# Patient Record
Sex: FEMALE | Race: WHITE | NOT HISPANIC OR LATINO | Employment: OTHER | URBAN - METROPOLITAN AREA
[De-identification: names, ages, dates, MRNs, and addresses within clinical notes are randomized per-mention and may not be internally consistent; named-entity substitution may affect disease eponyms.]

---

## 2017-03-01 ENCOUNTER — GENERIC CONVERSION - ENCOUNTER (OUTPATIENT)
Dept: OTHER | Facility: OTHER | Age: 77
End: 2017-03-01

## 2017-03-02 ENCOUNTER — APPOINTMENT (OUTPATIENT)
Dept: PHYSICAL THERAPY | Facility: CLINIC | Age: 77
End: 2017-03-02
Payer: MEDICARE

## 2017-03-02 PROCEDURE — G8991 OTHER PT/OT GOAL STATUS: HCPCS

## 2017-03-02 PROCEDURE — G8990 OTHER PT/OT CURRENT STATUS: HCPCS

## 2017-03-02 PROCEDURE — 97162 PT EVAL MOD COMPLEX 30 MIN: CPT

## 2017-03-06 ENCOUNTER — APPOINTMENT (OUTPATIENT)
Dept: PHYSICAL THERAPY | Facility: CLINIC | Age: 77
End: 2017-03-06
Payer: MEDICARE

## 2017-03-06 PROCEDURE — 97140 MANUAL THERAPY 1/> REGIONS: CPT

## 2017-03-06 PROCEDURE — 97110 THERAPEUTIC EXERCISES: CPT

## 2017-03-08 ENCOUNTER — APPOINTMENT (OUTPATIENT)
Dept: PHYSICAL THERAPY | Facility: CLINIC | Age: 77
End: 2017-03-08
Payer: MEDICARE

## 2017-03-08 PROCEDURE — 97110 THERAPEUTIC EXERCISES: CPT

## 2017-03-10 ENCOUNTER — APPOINTMENT (OUTPATIENT)
Dept: PHYSICAL THERAPY | Facility: CLINIC | Age: 77
End: 2017-03-10
Payer: MEDICARE

## 2017-03-13 ENCOUNTER — APPOINTMENT (OUTPATIENT)
Dept: PHYSICAL THERAPY | Facility: CLINIC | Age: 77
End: 2017-03-13
Payer: MEDICARE

## 2017-03-13 PROCEDURE — 97140 MANUAL THERAPY 1/> REGIONS: CPT

## 2017-03-13 PROCEDURE — 97110 THERAPEUTIC EXERCISES: CPT

## 2017-03-14 ENCOUNTER — APPOINTMENT (OUTPATIENT)
Dept: PHYSICAL THERAPY | Facility: CLINIC | Age: 77
End: 2017-03-14
Payer: MEDICARE

## 2017-03-16 ENCOUNTER — APPOINTMENT (OUTPATIENT)
Dept: PHYSICAL THERAPY | Facility: CLINIC | Age: 77
End: 2017-03-16
Payer: MEDICARE

## 2017-03-16 PROCEDURE — 97110 THERAPEUTIC EXERCISES: CPT

## 2017-03-16 PROCEDURE — 97140 MANUAL THERAPY 1/> REGIONS: CPT

## 2017-03-17 ENCOUNTER — APPOINTMENT (OUTPATIENT)
Dept: PHYSICAL THERAPY | Facility: CLINIC | Age: 77
End: 2017-03-17
Payer: MEDICARE

## 2017-03-17 PROCEDURE — G8979 MOBILITY GOAL STATUS: HCPCS | Performed by: PHYSICAL THERAPIST

## 2017-03-17 PROCEDURE — G8978 MOBILITY CURRENT STATUS: HCPCS | Performed by: PHYSICAL THERAPIST

## 2017-03-17 PROCEDURE — 97110 THERAPEUTIC EXERCISES: CPT

## 2017-03-17 PROCEDURE — 97140 MANUAL THERAPY 1/> REGIONS: CPT

## 2017-03-20 ENCOUNTER — APPOINTMENT (OUTPATIENT)
Dept: PHYSICAL THERAPY | Facility: CLINIC | Age: 77
End: 2017-03-20
Payer: MEDICARE

## 2017-03-20 PROCEDURE — 97110 THERAPEUTIC EXERCISES: CPT

## 2017-03-20 PROCEDURE — 97140 MANUAL THERAPY 1/> REGIONS: CPT

## 2017-03-22 ENCOUNTER — APPOINTMENT (OUTPATIENT)
Dept: PHYSICAL THERAPY | Facility: CLINIC | Age: 77
End: 2017-03-22
Payer: MEDICARE

## 2017-03-23 ENCOUNTER — APPOINTMENT (OUTPATIENT)
Dept: PHYSICAL THERAPY | Facility: CLINIC | Age: 77
End: 2017-03-23
Payer: MEDICARE

## 2017-03-23 PROCEDURE — 97140 MANUAL THERAPY 1/> REGIONS: CPT

## 2017-03-23 PROCEDURE — 97110 THERAPEUTIC EXERCISES: CPT

## 2017-03-24 ENCOUNTER — APPOINTMENT (OUTPATIENT)
Dept: PHYSICAL THERAPY | Facility: CLINIC | Age: 77
End: 2017-03-24
Payer: MEDICARE

## 2017-03-24 PROCEDURE — 97140 MANUAL THERAPY 1/> REGIONS: CPT

## 2017-03-24 PROCEDURE — 97110 THERAPEUTIC EXERCISES: CPT

## 2017-03-27 ENCOUNTER — APPOINTMENT (OUTPATIENT)
Dept: PHYSICAL THERAPY | Facility: CLINIC | Age: 77
End: 2017-03-27
Payer: MEDICARE

## 2017-03-27 PROCEDURE — 97140 MANUAL THERAPY 1/> REGIONS: CPT

## 2017-03-27 PROCEDURE — 97110 THERAPEUTIC EXERCISES: CPT

## 2017-03-27 PROCEDURE — G8991 OTHER PT/OT GOAL STATUS: HCPCS | Performed by: PHYSICAL THERAPIST

## 2017-03-27 PROCEDURE — G8990 OTHER PT/OT CURRENT STATUS: HCPCS | Performed by: PHYSICAL THERAPIST

## 2017-03-29 ENCOUNTER — APPOINTMENT (OUTPATIENT)
Dept: PHYSICAL THERAPY | Facility: CLINIC | Age: 77
End: 2017-03-29
Payer: MEDICARE

## 2017-03-29 PROCEDURE — 97110 THERAPEUTIC EXERCISES: CPT

## 2017-03-29 PROCEDURE — 97140 MANUAL THERAPY 1/> REGIONS: CPT

## 2017-03-31 ENCOUNTER — APPOINTMENT (OUTPATIENT)
Dept: PHYSICAL THERAPY | Facility: CLINIC | Age: 77
End: 2017-03-31
Payer: MEDICARE

## 2017-03-31 PROCEDURE — 97110 THERAPEUTIC EXERCISES: CPT

## 2017-03-31 PROCEDURE — 97140 MANUAL THERAPY 1/> REGIONS: CPT

## 2017-04-03 ENCOUNTER — APPOINTMENT (OUTPATIENT)
Dept: PHYSICAL THERAPY | Facility: CLINIC | Age: 77
End: 2017-04-03
Payer: MEDICARE

## 2017-04-03 PROCEDURE — 97110 THERAPEUTIC EXERCISES: CPT

## 2017-04-03 PROCEDURE — 97140 MANUAL THERAPY 1/> REGIONS: CPT

## 2017-04-05 ENCOUNTER — APPOINTMENT (OUTPATIENT)
Dept: PHYSICAL THERAPY | Facility: CLINIC | Age: 77
End: 2017-04-05
Payer: MEDICARE

## 2017-04-05 PROCEDURE — 97110 THERAPEUTIC EXERCISES: CPT

## 2017-04-05 PROCEDURE — 97140 MANUAL THERAPY 1/> REGIONS: CPT

## 2017-04-07 ENCOUNTER — APPOINTMENT (OUTPATIENT)
Dept: PHYSICAL THERAPY | Facility: CLINIC | Age: 77
End: 2017-04-07
Payer: MEDICARE

## 2017-04-07 PROCEDURE — 97110 THERAPEUTIC EXERCISES: CPT

## 2017-04-10 ENCOUNTER — APPOINTMENT (OUTPATIENT)
Dept: PHYSICAL THERAPY | Facility: CLINIC | Age: 77
End: 2017-04-10
Payer: MEDICARE

## 2017-04-10 PROCEDURE — 97110 THERAPEUTIC EXERCISES: CPT

## 2017-04-12 ENCOUNTER — APPOINTMENT (OUTPATIENT)
Dept: PHYSICAL THERAPY | Facility: CLINIC | Age: 77
End: 2017-04-12
Payer: MEDICARE

## 2017-04-12 PROCEDURE — 97140 MANUAL THERAPY 1/> REGIONS: CPT

## 2017-04-12 PROCEDURE — 97110 THERAPEUTIC EXERCISES: CPT

## 2017-04-14 ENCOUNTER — APPOINTMENT (OUTPATIENT)
Dept: PHYSICAL THERAPY | Facility: CLINIC | Age: 77
End: 2017-04-14
Payer: MEDICARE

## 2017-04-14 PROCEDURE — 97110 THERAPEUTIC EXERCISES: CPT

## 2017-04-14 PROCEDURE — 97140 MANUAL THERAPY 1/> REGIONS: CPT

## 2017-04-17 ENCOUNTER — APPOINTMENT (OUTPATIENT)
Dept: PHYSICAL THERAPY | Facility: CLINIC | Age: 77
End: 2017-04-17
Payer: MEDICARE

## 2017-04-17 PROCEDURE — 97110 THERAPEUTIC EXERCISES: CPT

## 2017-04-17 PROCEDURE — 97140 MANUAL THERAPY 1/> REGIONS: CPT

## 2017-04-19 ENCOUNTER — APPOINTMENT (OUTPATIENT)
Dept: PHYSICAL THERAPY | Facility: CLINIC | Age: 77
End: 2017-04-19
Payer: MEDICARE

## 2017-04-19 PROCEDURE — 97140 MANUAL THERAPY 1/> REGIONS: CPT

## 2017-04-19 PROCEDURE — G8991 OTHER PT/OT GOAL STATUS: HCPCS

## 2017-04-19 PROCEDURE — 97110 THERAPEUTIC EXERCISES: CPT

## 2017-04-19 PROCEDURE — G8990 OTHER PT/OT CURRENT STATUS: HCPCS

## 2017-04-19 PROCEDURE — 97164 PT RE-EVAL EST PLAN CARE: CPT

## 2017-04-20 ENCOUNTER — APPOINTMENT (OUTPATIENT)
Dept: PHYSICAL THERAPY | Facility: CLINIC | Age: 77
End: 2017-04-20
Payer: MEDICARE

## 2017-04-21 ENCOUNTER — APPOINTMENT (OUTPATIENT)
Dept: PHYSICAL THERAPY | Facility: CLINIC | Age: 77
End: 2017-04-21
Payer: MEDICARE

## 2017-04-21 PROCEDURE — 97140 MANUAL THERAPY 1/> REGIONS: CPT

## 2017-04-21 PROCEDURE — 97110 THERAPEUTIC EXERCISES: CPT

## 2017-04-24 ENCOUNTER — APPOINTMENT (OUTPATIENT)
Dept: PHYSICAL THERAPY | Facility: CLINIC | Age: 77
End: 2017-04-24
Payer: MEDICARE

## 2017-04-24 PROCEDURE — 97140 MANUAL THERAPY 1/> REGIONS: CPT

## 2017-04-24 PROCEDURE — 97110 THERAPEUTIC EXERCISES: CPT

## 2017-04-28 ENCOUNTER — APPOINTMENT (OUTPATIENT)
Dept: PHYSICAL THERAPY | Facility: CLINIC | Age: 77
End: 2017-04-28
Payer: MEDICARE

## 2017-04-28 PROCEDURE — 97110 THERAPEUTIC EXERCISES: CPT

## 2017-05-15 ENCOUNTER — ALLSCRIPTS OFFICE VISIT (OUTPATIENT)
Dept: OTHER | Facility: OTHER | Age: 77
End: 2017-05-15

## 2017-06-28 ENCOUNTER — GENERIC CONVERSION - ENCOUNTER (OUTPATIENT)
Dept: OTHER | Facility: OTHER | Age: 77
End: 2017-06-28

## 2017-08-08 ENCOUNTER — GENERIC CONVERSION - ENCOUNTER (OUTPATIENT)
Dept: OTHER | Facility: OTHER | Age: 77
End: 2017-08-08

## 2017-11-01 ENCOUNTER — ALLSCRIPTS OFFICE VISIT (OUTPATIENT)
Dept: OTHER | Facility: OTHER | Age: 77
End: 2017-11-01

## 2017-11-02 NOTE — PROGRESS NOTES
Assessment  1  Non-healing skin lesion (709 9) (L98 9)    Plan  Schedule Punch Bx     Discussion/Summary    Non-healing skin lesion on the mid-chest  After discussion w/ the pt, will schedule a punch Bx  History of Present Illness  The patient is being seen for an initial evaluation of skin lesion(s)  Initial presentation was unknown month(s) ago  The patient presented with a single lesion  Previous presentation included Non-helaing  This problem has not been previously evaluated  This problem has not been previously treated  The patient is currently asymptomatic        68 y/o WF for evaluation of a non-healing lesion on the mid-lower chest  She cannot recall how long she has had the lesion  Denies pain, d/c       Review of Systems    Constitutional: No fever, no chills, feels well, no tiredness, no recent weight gain or weight loss  Cardiovascular: No complaints of slow heart rate, no fast heart rate, no chest pain, no palpitations, no leg claudication, no lower extremity edema  Respiratory: No complaints of shortness of breath, no wheezing, no cough, no SOB on exertion, no orthopnea, no PND  Musculoskeletal: arthralgias-- and-- joint stiffness  Integumentary: as noted in HPI  Neurological: No complaints of headache, no confusion, no convulsions, no numbness, no dizziness or fainting, no tingling, no limb weakness, no difficulty walking  Psychiatric: Not suicidal, no sleep disturbance, no anxiety or depression, no change in personality, no emotional problems  Endocrine: No complaints of proptosis, no hot flashes, no muscle weakness, no deepening of the voice, no feelings of weakness  Hematologic/Lymphatic: No complaints of swollen glands, no swollen glands in the neck, does not bleed easily, does not bruise easily  ROS reviewed  Active Problems  1  Alcoholism, chronic (303 90) (F10 20)   2  Depression (311) (F32 9)   3  Fatigue (780 79) (R53 83)   4   Pain of right hip joint (995 45) (O66 166)    Surgical History  1  History of Hip Surgery    The surgical history was reviewed and updated today  Family History  Mother    1  Family history of lung cancer (V16 1) (Z80 1)  Father    2  Family history of diabetes mellitus (V18 0) (Z83 3)  Maternal Grandmother    3  Family history of diabetes mellitus (V18 0) (Z83 3)    Social History   · Alcohol use (V49 89) (Z78 9)   · Former smoker (Y41 51) (I19 707)   · Lives alone with help available (V60 3) (Z60 2)   · Retired   ·  (V61 07) (Z63 4)  The social history was reviewed and updated today  Current Meds   1  Citalopram Hydrobromide 10 MG Oral Tablet; Therapy: (Recorded:41Bkn1059) to Recorded    Allergies  1  Penicillins    Vitals  Vital Signs    Recorded: 19CPG0655 27:42KK   Systolic 613, RUE, Sitting   Diastolic 62, RUE, Sitting   Height 5 ft 7 in     Physical Exam    Constitutional   General appearance: No acute distress, well appearing and well nourished  Musculoskeletal   Gait and station: Abnormal   Gait evaluation demonstrated antalgia on the right-- and-- limping on the right  Skin   Skin and subcutaneous tissue: Normal without rashes or lesions  Examination of the skin for lesions: Abnormal  -- (--1 cm diameter circular and raised lesion on the mid-chest at the level of the Xifoide  Pearly edges  NT, no sx inflammation)        Future Appointments    Date/Time Provider Specialty Site   11/06/2017 11:15 AM WANDY [place]   [facility]     Signatures   Electronically signed by : WANDY Lopez ; Nov 1 2017 12:20PM EST                       (Author)

## 2017-11-02 NOTE — PROGRESS NOTES
Active Problems  1  Alcoholism, chronic (303 90) (F10 20)   2  Depression (311) (F32 9)   3  Fatigue (780 79) (R53 83)   4  Pain of right hip joint (719 45) (M25 551)    Family History  Mother    1  Family history of lung cancer (V16 1) (Z80 1)  Father    2  Family history of diabetes mellitus (V18 0) (Z83 3)  Maternal Grandmother    3  Family history of diabetes mellitus (V18 0) (Z83 3)    Social History   · Alcohol use (V49 89) (Z78 9)   · Former smoker (A08 85) (O85 426)   · Lives alone with help available (V60 3) (Z60 2)   · Retired   ·  (V61 07) (Z63 4)    Surgical History  1  History of Hip Surgery    Current Meds   1  Citalopram Hydrobromide 10 MG Oral Tablet; Therapy: (Recorded:04Nog4373) to Recorded    Allergies  1  Penicillins    Vitals   Recorded: 36RXT9247 38:09VD   Systolic 548, RUE, Sitting   Diastolic 62, RUE, Sitting   Height 5 ft 7 in     Future Appointments    Date/Time Provider Specialty Site   11/06/2017 11:15 AM WANDY [place]   [facility]     Signatures   Electronically signed by : WANDY Lam ; Nov 1 2017 12:43PM EST                       (Author)

## 2017-11-06 DIAGNOSIS — L98.9 DISORDER OF SKIN OR SUBCUTANEOUS TISSUE: ICD-10-CM

## 2017-11-06 PROCEDURE — 88305 TISSUE EXAM BY PATHOLOGIST: CPT | Performed by: FAMILY MEDICINE

## 2017-11-07 ENCOUNTER — LAB REQUISITION (OUTPATIENT)
Dept: LAB | Facility: HOSPITAL | Age: 77
End: 2017-11-07
Payer: MEDICARE

## 2017-11-07 DIAGNOSIS — L98.9 DISORDER OF SKIN OR SUBCUTANEOUS TISSUE: ICD-10-CM

## 2017-12-18 ENCOUNTER — GENERIC CONVERSION - ENCOUNTER (OUTPATIENT)
Dept: FAMILY MEDICINE CLINIC | Facility: CLINIC | Age: 77
End: 2017-12-18

## 2018-01-14 VITALS
SYSTOLIC BLOOD PRESSURE: 120 MMHG | BODY MASS INDEX: 25.58 KG/M2 | DIASTOLIC BLOOD PRESSURE: 80 MMHG | HEIGHT: 67 IN | WEIGHT: 163 LBS

## 2018-01-14 VITALS — DIASTOLIC BLOOD PRESSURE: 62 MMHG | SYSTOLIC BLOOD PRESSURE: 114 MMHG | HEIGHT: 67 IN

## 2018-03-28 ENCOUNTER — OFFICE VISIT (OUTPATIENT)
Dept: FAMILY MEDICINE CLINIC | Facility: CLINIC | Age: 78
End: 2018-03-28
Payer: MEDICARE

## 2018-03-28 VITALS
BODY MASS INDEX: 25.01 KG/M2 | OXYGEN SATURATION: 98 % | HEIGHT: 68 IN | DIASTOLIC BLOOD PRESSURE: 68 MMHG | WEIGHT: 165 LBS | SYSTOLIC BLOOD PRESSURE: 114 MMHG | TEMPERATURE: 99.1 F | HEART RATE: 84 BPM

## 2018-03-28 DIAGNOSIS — Z01.818 PREOPERATIVE CLEARANCE: Primary | ICD-10-CM

## 2018-03-28 DIAGNOSIS — M16.12 PRIMARY OSTEOARTHRITIS OF LEFT HIP: ICD-10-CM

## 2018-03-28 DIAGNOSIS — R06.00 DYSPNEA ON EXERTION: ICD-10-CM

## 2018-03-28 PROBLEM — F32.A DEPRESSION: Status: ACTIVE | Noted: 2017-05-15

## 2018-03-28 PROBLEM — R53.83 FATIGUE: Status: ACTIVE | Noted: 2017-05-15

## 2018-03-28 PROBLEM — F10.20 ALCOHOLISM, CHRONIC (HCC): Status: ACTIVE | Noted: 2017-05-15

## 2018-03-28 PROCEDURE — 99214 OFFICE O/P EST MOD 30 MIN: CPT | Performed by: PHYSICIAN ASSISTANT

## 2018-03-28 RX ORDER — CHLORAL HYDRATE 500 MG
1000 CAPSULE ORAL
COMMUNITY
End: 2019-01-07 | Stop reason: SDUPTHER

## 2018-03-28 RX ORDER — CITALOPRAM 10 MG/1
10 TABLET ORAL
COMMUNITY
End: 2018-10-01 | Stop reason: SDUPTHER

## 2018-03-28 NOTE — PROGRESS NOTES
Subjective:     Zahira Bobo is a 69 y o  female who presents to the office today for a preoperative consultation at the request of surgeon Dr Nazia Peña who plans on performing L VITO on   This consultation is requested for the specific conditions prompting preoperative evaluation (i e  because of potential affect on operative risk): Karley Singletary Planned anesthesia: general  The patient has the following known anesthesia issues: none  Patients bleeding risk: no recent abnormal bleeding  Patient does not have objections to receiving blood products if needed  Patient admit to GARZA but no lightheadedness or CP  The following portions of the patient's history were reviewed and updated as appropriate: allergies, current medications, past family history, past medical history, past social history, past surgical history and problem list     Review of Systems  A comprehensive review of systems was negative except for: hip pain and GARZA    Objective:      WDWN female, NAD  Ambulates with walker  LungS: CTAB  Heart: RRR no m/r/g  MS: no edema, restricted hip ROM  NECK; no bruits, lad, thyromegaly  ENT: MMM, no loose teeth    Predictors of intubation difficulty:   Morbid obesity? no   Anatomically abnormal facies? no   Prominent incisors? no   Receding mandible? no   Short, thick neck? no   Neck range of motion: normal   Mallampati score: I (soft palate, uvula, fauces, and tonsillar pillars visible)   Thyromental distance: > 6cm ( )   Mouth openin cm   Dentition: No chipped, loose, or missing teeth  Cardiographics  ECG: bradycardia, nonspecific ST and T wave abnormalities  Echocardiogram: not done, will order due to GARZA    Imaging  Chest x-ray: not performed     Lab Review   No visits with results within 2 Month(s) from this visit     Latest known visit with results is:   Lab Requisition on 2017   Component Date Value    Case Report 2017                      Value:Surgical Pathology Report Case: [id number]                                   Authorizing Provider:  Otilio Vaca MD      Collected:           11/06/2017                   Pathologist:           Cuong Henning MD           Received:            11/08/2017 1509              Specimen:    Skin, Other, Upper abdomen                                                                 Final Diagnosis 11/06/2017                      Value: This result contains rich text formatting which cannot be displayed here   Additional Information 11/06/2017                      Value: This result contains rich text formatting which cannot be displayed here  Tasha Kitchen Gross Description 11/06/2017                      Value: This result contains rich text formatting which cannot be displayed here   Clinical Information 11/06/2017                      Value:Basal cell    CBC, CMP, and coagulation studies performed 2 weeks ago by [facility]    Assessment:     69 y o  female with planned surgery as above  Known risk factors for perioperative complications: None    Difficulty with intubation is not anticipated  Cardiac Risk Estimation: pending Stress Echo dobutamine    Current medications which may produce withdrawal symptoms if withheld perioperatively: citalopram      Plan:     1  Preoperative workup as follows cardiac stress testing to exclude undiagnosed coronary disease ( )   2  Change in medication regimen before surgery: none, continue medication regimen including morning of surgery, with sip of water  3  Prophylaxis for cardiac events with perioperative beta-blockers: should be considered, specific regimen per anesthesia  4  Invasive hemodynamic monitoring perioperatively: should be considered    5  Deep vein thrombosis prophylaxis postoperatively:regimen to be chosen by surgical team   6  Surveillance for postoperative MI with ECG immediately postoperatively and on postoperative days 1 and 2 AND troponin levels 24 hours postoperatively and on day 4 or hospital discharge (whichever comes first): should be considered    7  Other measures: none

## 2018-04-03 DIAGNOSIS — R94.39 EQUIVOCAL STRESS TEST: Primary | ICD-10-CM

## 2018-05-04 DIAGNOSIS — M16.12 PRIMARY OSTEOARTHRITIS OF LEFT HIP: Primary | ICD-10-CM

## 2018-05-04 RX ORDER — IRON POLYSACCHARIDE COMPLEX 150 MG
325 CAPSULE ORAL
COMMUNITY

## 2018-05-04 RX ORDER — METHOCARBAMOL 500 MG/1
500 TABLET, FILM COATED ORAL 4 TIMES DAILY
Qty: 20 TABLET | Refills: 0 | Status: SHIPPED | OUTPATIENT
Start: 2018-05-04 | End: 2018-06-01

## 2018-05-21 ENCOUNTER — TELEPHONE (OUTPATIENT)
Dept: FAMILY MEDICINE CLINIC | Facility: CLINIC | Age: 78
End: 2018-05-21

## 2018-05-21 DIAGNOSIS — D64.9 LOW HEMOGLOBIN AND LOW HEMATOCRIT: Primary | ICD-10-CM

## 2018-05-24 ENCOUNTER — TELEPHONE (OUTPATIENT)
Dept: FAMILY MEDICINE CLINIC | Facility: CLINIC | Age: 78
End: 2018-05-24

## 2018-05-24 DIAGNOSIS — D64.9 LOW HEMOGLOBIN AND LOW HEMATOCRIT: Primary | ICD-10-CM

## 2018-05-24 LAB
BASOPHILS # BLD AUTO: 0.02 THOUSANDS/ΜL (ref 0–0.1)
BASOPHILS NFR BLD AUTO: 0 % (ref 0–1)
EOSINOPHIL # BLD AUTO: 0.08 THOUSAND/ΜL (ref 0–0.61)
EOSINOPHIL NFR BLD AUTO: 2 % (ref 0–6)
ERYTHROCYTE [DISTWIDTH] IN BLOOD BY AUTOMATED COUNT: 14.4 % (ref 11.6–15.1)
HCT VFR BLD AUTO: 34.8 % (ref 34.8–46.1)
HGB BLD-MCNC: 11.1 G/DL (ref 11.5–15.4)
LYMPHOCYTES # BLD AUTO: 1.74 THOUSANDS/ΜL (ref 0.6–4.47)
LYMPHOCYTES NFR BLD AUTO: 37 % (ref 14–44)
MCH RBC QN AUTO: 28.8 PG (ref 26.8–34.3)
MCHC RBC AUTO-ENTMCNC: 31.9 G/DL (ref 31.4–37.4)
MCV RBC AUTO: 90 FL (ref 82–98)
MONOCYTES # BLD AUTO: 0.54 THOUSAND/ΜL (ref 0.17–1.22)
MONOCYTES NFR BLD AUTO: 11 % (ref 4–12)
NEUTROPHILS # BLD AUTO: 2.36 THOUSANDS/ΜL (ref 1.85–7.62)
NEUTS SEG NFR BLD AUTO: 50 % (ref 43–75)
NRBC BLD AUTO-RTO: 0 /100 WBCS
PLATELET # BLD AUTO: 313 THOUSANDS/UL (ref 149–390)
PMV BLD AUTO: 9.3 FL (ref 8.9–12.7)
RBC # BLD AUTO: 3.86 MILLION/UL (ref 3.81–5.12)
WBC # BLD AUTO: 4.74 THOUSAND/UL (ref 4.31–10.16)

## 2018-05-24 PROCEDURE — 85025 COMPLETE CBC W/AUTO DIFF WBC: CPT | Performed by: PHYSICIAN ASSISTANT

## 2018-05-30 ENCOUNTER — OFFICE VISIT (OUTPATIENT)
Dept: FAMILY MEDICINE CLINIC | Facility: CLINIC | Age: 78
End: 2018-05-30
Payer: MEDICARE

## 2018-05-30 VITALS
DIASTOLIC BLOOD PRESSURE: 60 MMHG | WEIGHT: 160 LBS | HEART RATE: 72 BPM | SYSTOLIC BLOOD PRESSURE: 100 MMHG | OXYGEN SATURATION: 97 % | HEIGHT: 68 IN | TEMPERATURE: 99.1 F | BODY MASS INDEX: 24.25 KG/M2

## 2018-05-30 DIAGNOSIS — M16.12 PRIMARY OSTEOARTHRITIS OF LEFT HIP: Primary | ICD-10-CM

## 2018-05-30 DIAGNOSIS — D64.9 ANEMIA, UNSPECIFIED TYPE: ICD-10-CM

## 2018-05-30 PROBLEM — Z01.818 PREOPERATIVE CLEARANCE: Status: RESOLVED | Noted: 2018-03-28 | Resolved: 2018-05-30

## 2018-05-30 PROCEDURE — 99213 OFFICE O/P EST LOW 20 MIN: CPT | Performed by: PHYSICIAN ASSISTANT

## 2018-05-30 RX ORDER — CHLORAL HYDRATE 500 MG
CAPSULE ORAL
COMMUNITY

## 2018-05-30 RX ORDER — OXYCODONE HYDROCHLORIDE 5 MG/1
TABLET ORAL
COMMUNITY
Start: 2018-05-02 | End: 2019-01-07 | Stop reason: ALTCHOICE

## 2018-05-30 RX ORDER — CEPHALEXIN 500 MG/1
500 CAPSULE ORAL
COMMUNITY
Start: 2018-05-27 | End: 2018-06-03

## 2018-05-30 NOTE — ASSESSMENT & PLAN NOTE
Improving and stable at 11 1  Continue supplemental iron   F u 3 months and will repeat at that time

## 2018-05-30 NOTE — PROGRESS NOTES
Assessment/Plan:    Primary osteoarthritis of left hip  s/p THR  Doing well, Dr Yifan Zamudio following  Experiencing some back pain I expect from the change in alignment, advised she add OTC lidocaine patches and to discuss with Dr Yifan Zamudio  Anemia  Improving and stable at 11 1  Continue supplemental iron  F u 3 months and will repeat at that time       Diagnoses and all orders for this visit:    Primary osteoarthritis of left hip    Anemia, unspecified type    Other orders  -     Multiple Vitamins-Minerals (CENTRUM SILVER 50+MEN PO); Centrum Silver  -     cephalexin (KEFLEX) 500 mg capsule; Take 500 mg by mouth  -     oxyCODONE (ROXICODONE) 5 mg immediate release tablet; Earliest Fill Date: 5/2/18   -     Omega-3 Fatty Acids (FISH OIL) 1,000 mg; Fish Oil  -     Garlic 10 MG CAPS; garlic          Subjective:      Patient ID: Jaci Siegel is a 69 y o  female  71-year-old female presents for follow-up after left total hip replacement  She was admitted on April 11th and discharged April 14th under the care of Dr Yifan Zamudio  Underwent rehab services and had an uncomplicated stay  Continues PT  After surgery on the 12th hemoglobin levels were 9 4  It had trended down to a low of 7 3 on April 19th  Dr Yifan Zamudio prescribed iron supplements  Since then hemoglobin has been rising and 5 days ago hemoglobin was 11 1  No fever, chills, cough, chest pain, leg pain, dysuria, numbness or tingling  No fatigue, lightheadedness, change in bowels  Has some swelling in L ankle that normalizes over night and responds to elevation  Gets worse when dependent  The following portions of the patient's history were reviewed and updated as appropriate: allergies, current medications, past family history, past medical history, past social history, past surgical history and problem list     Review of Systems   Constitutional: Negative for chills and fever  Respiratory: Negative for cough and shortness of breath      Cardiovascular: Negative for chest pain  Musculoskeletal: Positive for arthralgias, back pain and gait problem  Negative for joint swelling  Skin: Negative for color change and rash  Neurological: Negative for weakness and numbness  Objective:      /60 (BP Location: Left arm, Patient Position: Sitting, Cuff Size: Standard)   Pulse 72   Temp 99 1 °F (37 3 °C)   Ht 5' 8" (1 727 m)   Wt 72 6 kg (160 lb)   SpO2 97%   BMI 24 33 kg/m²          Physical Exam   Constitutional: She is oriented to person, place, and time  She appears well-developed and well-nourished  No distress  HENT:   Head: Normocephalic and atraumatic  Mouth/Throat: Oropharynx is clear and moist    Eyes: Conjunctivae and EOM are normal  Pupils are equal, round, and reactive to light  Right eye exhibits no discharge  Left eye exhibits no discharge  No scleral icterus  Neck: Normal range of motion  Neck supple  No thyromegaly present  Cardiovascular: Normal rate, regular rhythm and normal heart sounds  Exam reveals no gallop and no friction rub  No murmur heard  Pulmonary/Chest: Effort normal and breath sounds normal  No respiratory distress  She has no wheezes  She has no rales  Musculoskeletal: Normal range of motion  Ambulating well with cane  L hip appears higher than R hip  Sacral area tender to palpation  2+ pitting edema over L ankle  No redness or tenderness  Pulses palpable  Lymphadenopathy:     She has no cervical adenopathy  Neurological: She is alert and oriented to person, place, and time  No cranial nerve deficit  Skin: Skin is warm and dry  No rash noted  She is not diaphoretic  No erythema  No pallor

## 2018-05-30 NOTE — ASSESSMENT & PLAN NOTE
s/p THR  Doing well, Dr Chaparro Reyes following  Experiencing some back pain I expect from the change in alignment, advised she add OTC lidocaine patches and to discuss with Dr Chaparro Reyes

## 2018-05-31 ENCOUNTER — TELEPHONE (OUTPATIENT)
Dept: FAMILY MEDICINE CLINIC | Facility: CLINIC | Age: 78
End: 2018-05-31

## 2018-06-04 ENCOUNTER — TELEPHONE (OUTPATIENT)
Dept: FAMILY MEDICINE CLINIC | Facility: CLINIC | Age: 78
End: 2018-06-04

## 2018-06-04 NOTE — TELEPHONE ENCOUNTER
Explained below to patient and patient understood  Also told patient to call office if it continues or worsens

## 2018-06-04 NOTE — TELEPHONE ENCOUNTER
Pt called stating she has finished the Keflex course given to her by Urgent care  The swelling and redness are improved, but patient still has the itching and it is going up the back of her ankle/leg  Pt  Inquiring if she needs another round of A/B's  Please advise

## 2018-06-04 NOTE — TELEPHONE ENCOUNTER
----- Message from Maximilian Romo PA-C sent at 6/4/2018  2:50 PM EDT -----  Unlikely infectious if just itchy   She should try OTC hydrocortisone and or calamine lotion

## 2018-06-18 ENCOUNTER — TRANSCRIBE ORDERS (OUTPATIENT)
Dept: PHYSICAL THERAPY | Facility: CLINIC | Age: 78
End: 2018-06-18

## 2018-06-18 ENCOUNTER — EVALUATION (OUTPATIENT)
Dept: PHYSICAL THERAPY | Facility: CLINIC | Age: 78
End: 2018-06-18
Payer: MEDICARE

## 2018-06-18 DIAGNOSIS — Z96.642 STATUS POST TOTAL HIP REPLACEMENT, LEFT: ICD-10-CM

## 2018-06-18 DIAGNOSIS — M25.552 LEFT HIP PAIN: Primary | ICD-10-CM

## 2018-06-18 PROCEDURE — 97161 PT EVAL LOW COMPLEX 20 MIN: CPT

## 2018-06-18 PROCEDURE — G8979 MOBILITY GOAL STATUS: HCPCS

## 2018-06-18 PROCEDURE — 97110 THERAPEUTIC EXERCISES: CPT

## 2018-06-18 PROCEDURE — G8978 MOBILITY CURRENT STATUS: HCPCS

## 2018-06-18 NOTE — LETTER
2018    Kevin Shrestha MD  [address] [facility]  [address] [facility]    Patient: Kay Westfall   YOB: 1940   Date of Visit: 2018     Encounter Diagnosis     ICD-10-CM    1  Left hip pain M25 552    2  Status post total hip replacement, left T14 912        Dear Dr Ji Lopez:    Please review the attached Plan of Care from Leslie Edge recent visit  Please verify that you agree therapy should continue by signing the attached document and sending it back to our office  If you have any questions or concerns, please don't hesitate to call  Sincerely,    Sharyle Small, PT      Referring Provider:      I certify that I have read the below Plan of Care and certify the need for these services furnished under this plan of treatment while under my care  Lonza MD Fady  [address] [facility]: [phone]          PT Evaluation     Today's date: 2018  Patient name: Kay Westfall  : 1940  MRN: [id number]  Referring provider: Ben Mayer MD  Dx:   Encounter Diagnosis     ICD-10-CM    1  Status post revision of total hip replacement Z96 649    2  Left hip pain M25 552                   Assessment  Impairments: abnormal gait, abnormal or restricted ROM, impaired physical strength and lacks appropriate home exercise program    Assessment details: Kay Westfall is a 69 y o  female presents s/p L VITO 18  Pt with PSH L hip osteotomy, with that hardware removed on 18  Kay Westfall has the above listed impairments and will benefit from skilled PT to improve deficits to return to prior level of function  Kay Westfall was educated on eval findings and plan for management  HEP initiated  Alline Friend would benefit from skilled services to improve ROM, strength, flexibility, and function      Understanding of Dx/Px/POC: good   Prognosis: good    Goals  Impairment Goals  - Pt I with initial HEP in 1-2 visits  - Improve ROM equal to contralateral side in 4-6 weeks  - Increase strength to 5/5 in all affected areas in 4-6 week    Functional Goals  - Increase Functional Status Measure to: 56 in 4-6  weeks  - Patient will be independent with comprehensive HEP in 4-6 weeks  - Ambulation is improved to prior level of function in 4-6 weeks  - Stair climbing is improved to prior level of function in 4-6 weeks  - LLE dynamic strength and balance at least 90% of RLE via single leg step test, in 4-6 wks  - TUG with no AD, < 14 sec, in 4-6 wks  Plan  Patient would benefit from: skilled physical therapy  Planned modality interventions: cryotherapy  Planned therapy interventions: stretching, strengthening, therapeutic exercise, flexibility, home exercise program, gait training and patient education  Frequency: 2x week  Duration in visits: 8  Duration in weeks: 4  Treatment plan discussed with: patient        Subjective Evaluation    History of Present Illness  Date of surgery: 2018  Mechanism of injury: surgery  Mechanism of injury: 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  Pt with at least 4 months of L hip pain with LLE atrophy, shortening requiring shoe lift  Pain, deformity worsened, began to develop LBP  These circumstances led to L VITO on 18 with removal of osteotomy hardware  Chief complaint currently "since the surgery he was pleased (Dr Holly Laguerre), he has brought my leg almost to an equal length  I can tell the left leg is a little shorter, and my right knee bothers me because it's taking more of the wear "  Chief complaint gait dysfunction, "I feel like I've gotten weaker, I've lost a lot of my strength "  Baseline ambulation pattern with no AD, required [facility] for 1 month prior to L VITO    Pain  No pain reported  Current pain ratin  At best pain ratin  At worst pain ratin  Progression: improved    Social Support  Steps to enter house: yes  1  Stairs in house: yes   1  Lives in: [place] house  Lives with: alone    Employment status: not working (Retired)    Diagnostic Tests  X-ray: normal (Well placed L VITO prosthesis post op)  Treatments  Previous treatment: physical therapy  Discharged from (in last 30 days): home health care  Discharged from (in last 30 days) comments: D/C from inpt rehab 5/2/18  Patient Goals  Patient goals for therapy: independence with ADLs/IADLs, increased strength, increased motion, improved balance and return to sport/leisure activities  Patient goal: "I'd like to go play golf "        Objective    Gait: SPC R hand, 3 point step to pattern, WBAT LLE, antalgic LLE     TUG: with SPC R hand, 19 71 sec  Observation: L lateral hip VITO incision healing well, no sign of infection  Posture with R knee slightly flexed; when R knee extended pt with level pelvis  OH squat: good balance, ROM 50% of normal     Hip A/PROM:    R hip WFL grossly assessed  R hip ABD 30 deg, ER 32 deg  L hip flexion at least 90 deg  Ext -20 deg  ABD 8 deg  ER 30 deg  B knee A/PROM [facility] grossly assessed  MMT: iliopsoas: R 5/5, L 4/5  Glute med L 2+/5  Glute max L 2+/5  B quads and hamstrings 5/5                Precautions: L VITO    Daily Treatment Diary       Manuals 6/18/ 18            Man str ABD, ext, ER                                                    Exercise Diary              TA cx 10"x 15            Bridge  Stand Adductor str x10            Clamshell L w pillow 10'X 10            Stand HF str             Wall gastroc str             Stand L hip march, ABD, Ext             TKE with t-band             T ball squat             FSU                                                                                                                                                                                      Modalities             Ice

## 2018-06-18 NOTE — PROGRESS NOTES
PT Evaluation     Today's date: 2018  Patient name: Valentina Bagley  : 1940  MRN: [id number]  Referring provider: Sindhu Burgess MD  Dx:   Encounter Diagnosis     ICD-10-CM    1  Status post revision of total hip replacement Z96 649    2  Left hip pain M25 552                   Assessment  Impairments: abnormal gait, abnormal or restricted ROM, impaired physical strength and lacks appropriate home exercise program    Assessment details: Valentina Bagley is a 69 y o  female presents s/p L VITO 18  Pt with PSH L hip osteotomy, with that hardware removed on 18  Valentina Bagley has the above listed impairments and will benefit from skilled PT to improve deficits to return to prior level of function  Valentian Bagley was educated on eval findings and plan for management  HEP initiated  Rita Anderson would benefit from skilled services to improve ROM, strength, flexibility, and function  Understanding of Dx/Px/POC: good   Prognosis: good    Goals  Impairment Goals  - Pt I with initial HEP in 1-2 visits  - Improve ROM equal to contralateral side in 4-6 weeks  - Increase strength to 5/5 in all affected areas in 4-6 week    Functional Goals  - Increase Functional Status Measure to: 56 in 4-6  weeks  - Patient will be independent with comprehensive HEP in 4-6 weeks  - Ambulation is improved to prior level of function in 4-6 weeks  - Stair climbing is improved to prior level of function in 4-6 weeks  - LLE dynamic strength and balance at least 90% of RLE via single leg step test, in 4-6 wks  - TUG with no AD, < 14 sec, in 4-6 wks        Plan  Patient would benefit from: skilled physical therapy  Planned modality interventions: cryotherapy  Planned therapy interventions: stretching, strengthening, therapeutic exercise, flexibility, home exercise program, gait training and patient education  Frequency: 2x week  Duration in visits: 8  Duration in weeks: 4  Treatment plan discussed with: patient        Subjective Evaluation    History of Present Illness  Date of surgery: 2018  Mechanism of injury: surgery  Mechanism of injury: 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  Pt with at least 4 months of L hip pain with LLE atrophy, shortening requiring shoe lift  Pain, deformity worsened, began to develop LBP  These circumstances led to L VITO on 18 with removal of osteotomy hardware  Chief complaint currently "since the surgery he was pleased (Dr Sheela Morgan), he has brought my leg almost to an equal length  I can tell the left leg is a little shorter, and my right knee bothers me because it's taking more of the wear "  Chief complaint gait dysfunction, "I feel like I've gotten weaker, I've lost a lot of my strength "  Baseline ambulation pattern with no AD, required [facility] for 1 month prior to L VITO  Pain  No pain reported  Current pain ratin  At best pain ratin  At worst pain ratin  Progression: improved    Social Support  Steps to enter house: yes  1  Stairs in house: yes   1  Lives in: [facility]  Lives with: alone    Employment status: not working (Retired)    Diagnostic Tests  X-ray: normal (Well placed L VITO prosthesis post op)  Treatments  Previous treatment: physical therapy  Discharged from (in last 30 days): home health care  Discharged from (in last 30 days) comments: D/C from inpt rehab 18  Patient Goals  Patient goals for therapy: independence with ADLs/IADLs, increased strength, increased motion, improved balance and return to sport/leisure activities  Patient goal: "I'd like to go play golf "        Objective    Gait: SPC R hand, 3 point step to pattern, WBAT LLE, antalgic LLE     TUG: with SPC R hand, 19 71 sec  Observation: L lateral hip VITO incision healing well, no sign of infection  Posture with R knee slightly flexed; when R knee extended pt with level pelvis  OH squat: good balance, ROM 50% of normal     Hip A/PROM:    R hip WFL grossly assessed    R hip ABD 30 deg, ER 32 deg  L hip flexion at least 90 deg  Ext -20 deg  ABD 8 deg  ER 30 deg  B knee A/PROM [facility] grossly assessed  MMT: iliopsoas: R 5/5, L 4/5  Glute med L 2+/5  Glute max L 2+/5  B quads and hamstrings 5/5                Precautions: L VITO    Daily Treatment Diary       Manuals 6/18/ 18            Man str ABD, ext, ER                                                    Exercise Diary              TA cx 10"x 15            Bridge  Stand Adductor str x10            Clamshell L w pillow 10'X 10            Stand HF str             Wall gastroc str             Stand L hip march, ABD, Ext             TKE with t-band             T ball squat             FSU                                                                                                                                                                                      Modalities             Ice

## 2018-06-19 ENCOUNTER — OFFICE VISIT (OUTPATIENT)
Dept: PHYSICAL THERAPY | Facility: CLINIC | Age: 78
End: 2018-06-19
Payer: MEDICARE

## 2018-06-19 DIAGNOSIS — M25.552 LEFT HIP PAIN: Primary | ICD-10-CM

## 2018-06-19 DIAGNOSIS — Z96.642 STATUS POST TOTAL HIP REPLACEMENT, LEFT: ICD-10-CM

## 2018-06-19 PROCEDURE — 97110 THERAPEUTIC EXERCISES: CPT

## 2018-06-19 NOTE — PROGRESS NOTES
Daily Note     Today's date: 2018  Patient name: Jimena Concepcion  : 1940  MRN: [id number]  Referring provider: Carolyn Zamarripa MD  Dx:   Encounter Diagnosis     ICD-10-CM    1  Left hip pain M25 552    2  Status post total hip replacement, left [id number] [id number]                   Subjective: pt without new compliant, L hip pain 0/10  Objective: See treatment diary below    Precautions: L VITO    Daily Treatment Diary       Manuals            Man str ABD, ext, ER  20"x5 ea                                                  Exercise Diary              TA cx 10"x 15 -           Bridge  Stand Adductor str x10 x30    No str           Clamshell L w pillow 10'X 10 x30           Stand HF str  No str           Wall gastroc str  20"x5           Stand L hip march, ABD, Ext  1# x10           TKE with t-band  blk 3x10           T ball squat             FSU  4" x10           Nu Step  10'                                                                                                                                                                       Modalities             Ice                                        Assessment: Tolerated treatment well  Patient would benefit from continued PT      Plan: Continue per plan of care

## 2018-06-25 ENCOUNTER — OFFICE VISIT (OUTPATIENT)
Dept: PHYSICAL THERAPY | Facility: CLINIC | Age: 78
End: 2018-06-25
Payer: MEDICARE

## 2018-06-25 DIAGNOSIS — Z96.642 STATUS POST TOTAL HIP REPLACEMENT, LEFT: ICD-10-CM

## 2018-06-25 DIAGNOSIS — M25.552 LEFT HIP PAIN: Primary | ICD-10-CM

## 2018-06-25 PROCEDURE — 97110 THERAPEUTIC EXERCISES: CPT

## 2018-06-25 NOTE — PROGRESS NOTES
Daily Note     Today's date: 2018  Patient name: Yanely Boogie  : 1940  MRN: [id number]  Referring provider: Gloria Alvarez MD  Dx:   Encounter Diagnosis     ICD-10-CM    1  Left hip pain M25 552    2  Status post total hip replacement, left [id number] [id number]        Start Time: 1100  Stop Time: 1153  Total time in clinic (min): 53 minutes    Subjective: Patient indicates generalized joint stiffness and feels that she is leaning while walking  She denies pain with movement or weight bearing  Objective: See treatment diary below    Precautions: L VITO    Daily Treatment Diary       Manuals           Man str ABD, ext, ER  20"x5 ea 20''x5                                                 Exercise Diary              TA cx 10"x 15 -           Bridge  Stand Adductor str x10 x30    No str x30          Clamshell L w pillow 10'X 10 x30 x30          Stand HF str  No str           Wall gastroc str  20"x5 5x20''          Stand L hip march, ABD, Ext  1# x10 2x10          TKE with t-band  blk 3x10 3x10          T ball squat             FSU  4" x10 2x10          Nu Step  10' 10'                                                                                                                                                                      Modalities             Ice                                        Assessment: Patient demonstrated difficulty maintaining a level, neutral pelvis during ambulation  Patient is a good rehabilitation candidate and would benefit from continued skilled PT intervention to address her remaining deficits  Plan: Continue per plan of care

## 2018-06-27 ENCOUNTER — OFFICE VISIT (OUTPATIENT)
Dept: PHYSICAL THERAPY | Facility: CLINIC | Age: 78
End: 2018-06-27
Payer: MEDICARE

## 2018-06-27 DIAGNOSIS — M25.552 LEFT HIP PAIN: Primary | ICD-10-CM

## 2018-06-27 DIAGNOSIS — Z96.642 STATUS POST TOTAL HIP REPLACEMENT, LEFT: ICD-10-CM

## 2018-06-27 PROCEDURE — 97140 MANUAL THERAPY 1/> REGIONS: CPT

## 2018-06-27 PROCEDURE — 97110 THERAPEUTIC EXERCISES: CPT

## 2018-06-27 NOTE — PROGRESS NOTES
Daily Note     Today's date: 2018  Patient name: Andre Kocher  : 1940  MRN: [id number]  Referring provider: Owen Grewal MD  Dx:   Encounter Diagnosis     ICD-10-CM    1  Left hip pain M25 552    2  Status post total hip replacement, left [id number] 642        Start Time: 1000  Stop Time: 1110  Total time in clinic (min): 70 minutes    Subjective: Patient denies pain again this AM and notes that she did not experience any DOMS after the last session  Objective: See treatment diary below    Precautions: L VITO    Daily Treatment Diary       Manuals          Man str ABD, ext, ER  20"x5 ea 20''x5 20''x5                                                Exercise Diary             TA cx 10"x 15 -           Bridge  Stand Adductor str x10 x30    No str x30 x30         Clamshell L w pillow 10'X 10 x30 x30 x30         Stand HF str  No str           Wall gastroc str  20"x5 5x20'' 5x20''         Stand L hip march, ABD, Ext  1# x10 2x10 2x10         TKE with t-band  blk 3x10 3x10 3x10         T ball squat             FSU  4" x10 2x10 2x10         Nu Step  10' 10' 10'         Mini squat    2x10         Sit to stand    2x10                                                                                                                                           Modalities             Ice                                  Assessment: Patient demonstrated mild difficulty with closed chain eccentric loading  Sit to stand was also challenging for the patient  Patient again denied pain post session and would benefit from continued skilled PT intervention  Plan: Continue per plan of care

## 2018-07-02 ENCOUNTER — OFFICE VISIT (OUTPATIENT)
Dept: PHYSICAL THERAPY | Facility: CLINIC | Age: 78
End: 2018-07-02
Payer: MEDICARE

## 2018-07-02 DIAGNOSIS — M25.552 LEFT HIP PAIN: Primary | ICD-10-CM

## 2018-07-02 DIAGNOSIS — Z96.642 STATUS POST TOTAL HIP REPLACEMENT, LEFT: ICD-10-CM

## 2018-07-02 PROCEDURE — 97140 MANUAL THERAPY 1/> REGIONS: CPT

## 2018-07-02 PROCEDURE — 97110 THERAPEUTIC EXERCISES: CPT

## 2018-07-02 NOTE — PROGRESS NOTES
Daily Note     Today's date: 2018  Patient name: Suhail Cota  : 1940  MRN: [id number]  Referring provider: Elsa Jarrett MD  Dx:   Encounter Diagnosis     ICD-10-CM    1  Left hip pain M25 552    2  Status post total hip replacement, left [id number] 642        Start Time: 1000  Stop Time: 1100  Total time in clinic (min): 60 minutes    Subjective: Patient reports doing pretty good but soreness is persistent at 3-4/10  Reports still has trouble with steps  Objective: See treatment diary below    Precautions: L VITO    Daily Treatment Diary       Manuals  7/        Man str ABD, ext, ER  20"x5 ea 20''x5 20''x5 20"x5                                               Exercise Diary     72        TA cx 10"x 15 -           Bridge  Stand Adductor str x10 x30    No str x30 x30 x30        Clamshell L w pillow 10'X 10 x30 x30 x30 x30        Stand HF str  No str           Wall gastroc str  20"x5 5x20'' 5x20'' 20"x5        Stand L hip march, ABD, Ext  1# x10 2x10 2x10 2x10        TKE with t-band  blk 3x10 3x10 3x10 3x10        T ball squat             FSU  4" x10 2x10 2x10 2x10        Nu Step  10' 10' 10' 10'        Mini squat    2x10 2x10        Sit to stand    2x10 2x10                                                                                                                                          Modalities             Ice                            Assessment: Tolerated treatment well  Patient appears to have increasing ROM and strength  Patient requires 25% verbal cueing for exercises  Plan: Continue per plan of care

## 2018-07-05 ENCOUNTER — OFFICE VISIT (OUTPATIENT)
Dept: PHYSICAL THERAPY | Facility: CLINIC | Age: 78
End: 2018-07-05
Payer: MEDICARE

## 2018-07-05 DIAGNOSIS — Z96.642 STATUS POST TOTAL HIP REPLACEMENT, LEFT: ICD-10-CM

## 2018-07-05 DIAGNOSIS — M25.552 LEFT HIP PAIN: Primary | ICD-10-CM

## 2018-07-05 PROCEDURE — 97110 THERAPEUTIC EXERCISES: CPT

## 2018-07-05 NOTE — PROGRESS NOTES
Daily Note     Today's date: 2018  Patient name: Jaci Siegel  : 1940  MRN: [id number]  Referring provider: Sukumar Hubbard MD  Dx:   Encounter Diagnosis     ICD-10-CM    1  Left hip pain M25 552    2  Status post total hip replacement, left Z39 [id number]                   Subjective: pt without new complaint, notes functional mobility "improved a little "  L hip pain 0/10  Objective: See treatment diary below    Precautions: L VITO    Daily Treatment Diary       Manuals  7       Man str ABD, ext, ER  20"x5 ea 20''x5 20''x5 20"x5 x5                                              Exercise Diary            TA cx 10"x 15 -           Bridge  Stand Adductor str x10 x30    No str x30 x30 x30 x30       Clamshell L w pillow 10'X 10 x30 x30 x30 x30 30       Stand HF str  No str           Wall gastroc str  20"x5 5x20'' 5x20'' 20"x5 x5       Stand L hip march, ABD, Ext  1# x10 2x10 2x10 2x10 2# 3x10       TKE with t-band  blk 3x10 3x10 3x10 3x10 Silver 3x10       T ball squat             FSU  4" x10 2x10 2x10 2x10 3x10 step overs       Nu Step  10' 10' 10' 10' 10'       Mini squat    2x10 2x10 3x10       Sit to stand    2x10 2x10 3x10                                                                                                                                         Modalities             Ice                                  Assessment: Tolerated treatment well  Patient exhibited good technique with therapeutic exercises  ROM improving  Plan: Continue per plan of care

## 2018-07-09 ENCOUNTER — APPOINTMENT (OUTPATIENT)
Dept: PHYSICAL THERAPY | Facility: CLINIC | Age: 78
End: 2018-07-09
Payer: MEDICARE

## 2018-07-10 ENCOUNTER — OFFICE VISIT (OUTPATIENT)
Dept: PHYSICAL THERAPY | Facility: CLINIC | Age: 78
End: 2018-07-10
Payer: MEDICARE

## 2018-07-10 DIAGNOSIS — Z96.642 STATUS POST TOTAL HIP REPLACEMENT, LEFT: ICD-10-CM

## 2018-07-10 DIAGNOSIS — M25.552 LEFT HIP PAIN: Primary | ICD-10-CM

## 2018-07-10 PROCEDURE — 97110 THERAPEUTIC EXERCISES: CPT

## 2018-07-10 NOTE — PROGRESS NOTES
Daily Note     Today's date: 7/10/2018  Patient name: Michael Thomas  : 1940  MRN: [id number]  Referring provider: Roel Nice MD  Dx:   Encounter Diagnosis     ICD-10-CM    1  Left hip pain M25 552    2  Status post total hip replacement, left M97 667                   Subjective: pt reports starting to walk around the house with no AD  L hip pain 0/10  Objective: See treatment diary below    Precautions: L VITO    Daily Treatment Diary       Manuals  7/10      Man str ABD, ext, ER  20"x5 ea 20''x5 20''x5 20"x5 x5 x5                                             Exercise Diary            TA cx 10"x 15 -           Bridge  Stand Adductor str x10 x30    No str x30 x30 x30 x30 30      Clamshell L w pillow 10'X 10 x30 x30 x30 x30 30 30      Stand HF str  No str           Wall gastroc str  20"x5 5x20'' 5x20'' 20"x5 x5 x5      Stand L hip march, ABD, Ext  1# x10 2x10 2x10 2x10 2# 3x10 3x10      TKE with t-band  blk 3x10 3x10 3x10 3x10 Silver 3x10 3x10      T ball squat             FSU  4" x10 2x10 2x10 2x10 3x10 step overs 3x10      Nu Step  10' 10' 10' 10' 10' 10'      Mini squat    2x10 2x10 3x10 3x10      Sit to stand    2x10 2x10 3x10 3x10                                                                                                                                        Modalities             Ice                                  Assessment: Tolerated treatment well  Patient demonstrated fatigue post treatment      Plan: Continue per plan of care

## 2018-07-11 ENCOUNTER — OFFICE VISIT (OUTPATIENT)
Dept: PHYSICAL THERAPY | Facility: CLINIC | Age: 78
End: 2018-07-11
Payer: MEDICARE

## 2018-07-11 DIAGNOSIS — Z96.642 STATUS POST TOTAL HIP REPLACEMENT, LEFT: ICD-10-CM

## 2018-07-11 DIAGNOSIS — M25.552 LEFT HIP PAIN: Primary | ICD-10-CM

## 2018-07-11 PROCEDURE — 97110 THERAPEUTIC EXERCISES: CPT

## 2018-07-11 NOTE — PROGRESS NOTES
Daily Note     Today's date: 2018  Patient name: Gina Vieira  : 1940  MRN: [id number]  Referring provider: Katt Lane MD  Dx:   Encounter Diagnosis     ICD-10-CM    1  Left hip pain M25 552    2  Status post total hip replacement, left W98 333                   Subjective: Patient reports has had no pain in hip at all  Reports trying to take bigger strides when walking  Reports stiffness is her biggest complaint at this point  Objective: See treatment diary below  Precautions: L VITO    Daily Treatment Diary       Manuals 6/18/ 18 6/19 6/25 6/27 7/2 7/5 7/10 7/11     Man str ABD, ext, ER  20"x5 ea 20''x5 20''x5 20"x5 x5 x5 x5                                            Exercise Diary            TA cx 10"x 15 -           Bridge  Stand Adductor str x10 x30    No str x30 x30 x30 x30 30 x30     Clamshell L w pillow 10'X 10 x30 x30 x30 x30 30 30 30x5"     Stand HF str  No str           Wall gastroc str  20"x5 5x20'' 5x20'' 20"x5 x5 x5 x5     Stand L hip march, ABD, Ext  1# x10 2x10 2x10 2x10 2# 3x10 3x10 2#  3x10     TKE with t-band  blk 3x10 3x10 3x10 3x10 Silver 3x10 3x10 3x10     T ball squat             FSU  4" x10 2x10 2x10 2x10 3x10 step overs 3x10 3x10     Nu Step  10' 10' 10' 10' 10' 10' 10'     Mini squat    2x10 2x10 3x10 3x10 3x10     Sit to stand    2x10 2x10 3x10 3x10 3x10                                                                                                                                       Modalities             Ice                                Assessment: Tolerated treatment well  Patient continues to have weakness but strength is improving  Patient leg length discrepancy limiting functional ambulation  Patient appears concerned about leg length discrepancy and she will be addressing with doctor  Patient given 30% verbal cueing to improve technique of exercises  Plan: Continue per plan of care

## 2018-07-16 ENCOUNTER — OFFICE VISIT (OUTPATIENT)
Dept: PHYSICAL THERAPY | Facility: CLINIC | Age: 78
End: 2018-07-16
Payer: MEDICARE

## 2018-07-16 DIAGNOSIS — Z96.642 STATUS POST TOTAL HIP REPLACEMENT, LEFT: ICD-10-CM

## 2018-07-16 DIAGNOSIS — M25.552 LEFT HIP PAIN: Primary | ICD-10-CM

## 2018-07-16 PROCEDURE — 97110 THERAPEUTIC EXERCISES: CPT

## 2018-07-16 NOTE — PROGRESS NOTES
Daily Note     Today's date: 2018  Patient name: Vivian Bear  : 1940  MRN: [id number]  Referring provider: Jenniffer Stafford MD  Dx:   Encounter Diagnosis     ICD-10-CM    1  Left hip pain M25 552    2  Status post total hip replacement, left K47 108                   Subjective: L hip pain 0/10   "I'm not sure I'm walking any better "  Pt c/o L shoulder, upper arm pain after pulling clothes out of closet  Pt notes L PSIS pain with sleeping in R sidelying  Notes LLD post-op with RLE long vs L  Objective: See treatment diary below  Pt with L lateral hip discomfort, no anterior stretch with L standing hip flexor stretch and manual HF stretch  LLE markedly short in supine, L ASIS superior vs R   L stand march test (+)  Following self MET for L inom post rotation LLD in supine improved but still present, L ASIS level with R  Following tx, pt gait with SPC R, with improved R stride length, L hip ext with R stride      Precautions: L VITO    Daily Treatment Diary       Manuals 6/18/ 18 6/19 6/25 6/27 7/2 7/5 7/10 7/11 7/16    Man str ABD, ext, ER  20"x5 ea 20''x5 20''x5 20"x5 x5 x5 x5 x5    L inom ant rot mob         G3    L inom ant rot SCS         90"                 Exercise Diary            TA cx 10"x 15 -           Bridge  Stand Adductor str x10 x30    No str x30 x30 x30 x30 30 x30 x30    20"x5    Clamshell L w pillow 10'X 10 x30 x30 x30 x30 30 30 30x5" x5    Stand HF str  No str       No str    Wall gastroc str  20"x5 5x20'' 5x20'' 20"x5 x5 x5 x5 x5    Stand L hip march, ABD, Ext  1# x10 2x10 2x10 2x10 2# 3x10 3x10 2#  3x10 3x10    TKE with t-band  blk 3x10 3x10 3x10 3x10 Silver 3x10 3x10 3x10 3x10    T ball squat             FSU  4" x10 2x10 2x10 2x10 3x10 step overs 3x10 3x10 3x10    Nu Step  10' 10' 10' 10' 10' 10' 10' 10'    Mini squat    2x10 2x10 3x10 3x10 3x10 3x10    Sit to stand    2x10 2x10 3x10 3x10 3x10 3x10    Bridge w ABD, t-band         Green 2x10    Self MET L inom post rot         5"x3                                                                                                            Modalities             Ice                                      Assessment: Tolerated treatment well  Patient would benefit from continued PT  Presenting with functional LLD limiting hip ext stretching due to posterior rotation L side  Gait improved after tx, improved R stride length  Plan: Progress note during next visit

## 2018-07-18 ENCOUNTER — TRANSCRIBE ORDERS (OUTPATIENT)
Dept: PHYSICAL THERAPY | Facility: CLINIC | Age: 78
End: 2018-07-18

## 2018-07-18 ENCOUNTER — EVALUATION (OUTPATIENT)
Dept: PHYSICAL THERAPY | Facility: CLINIC | Age: 78
End: 2018-07-18
Payer: MEDICARE

## 2018-07-18 DIAGNOSIS — M25.552 LEFT HIP PAIN: Primary | ICD-10-CM

## 2018-07-18 DIAGNOSIS — M16.12 PRIMARY OSTEOARTHRITIS OF LEFT HIP: ICD-10-CM

## 2018-07-18 DIAGNOSIS — Z96.642 PRESENCE OF LEFT ARTIFICIAL HIP JOINT: ICD-10-CM

## 2018-07-18 DIAGNOSIS — Z96.642 STATUS POST TOTAL HIP REPLACEMENT, LEFT: ICD-10-CM

## 2018-07-18 PROCEDURE — 97110 THERAPEUTIC EXERCISES: CPT

## 2018-07-18 PROCEDURE — G8979 MOBILITY GOAL STATUS: HCPCS

## 2018-07-18 PROCEDURE — 97164 PT RE-EVAL EST PLAN CARE: CPT

## 2018-07-18 PROCEDURE — G8978 MOBILITY CURRENT STATUS: HCPCS

## 2018-07-18 NOTE — LETTER
2018    MD [address] [phone]    Patient: Milena Peña   YOB: 1940   Date of Visit: 2018     Encounter Diagnosis     ICD-10-CM    1  Left hip pain M25 552    2  Primary osteoarthritis of left hip M16 12    3  Status post total hip replacement, left [id number] [id number]        Dear Dr Selene Morrissey:    Please review the attached Plan of Care from Sandra Fowler recent visit  Please verify that you agree therapy should continue by signing the attached document and sending it back to our office  If you have any questions or concerns, please don't hesitate to call  Sincerely,    Ileana Rasmussen PT      Referring Provider:      I certify that I have read the below Plan of Care and certify the need for these services furnished under this plan of treatment while under my care  Elas Gaspar MD  [address]  [address] [address]  VIA Facsimile: [phone]          Daily Note     Today's date: 2018  Patient name: Milena Peña  : 1940  MRN: [id number]  Referring provider: Ramone Lao MD  Dx:   Encounter Diagnosis     ICD-10-CM    1  Left hip pain M25 552    2  Primary osteoarthritis of left hip M16 12    3  Status post total hip replacement, left Z96 642        Start Time: 952  Stop Time: 1100  Total time in clinic (min): 68 minutes    Subjective: pt noting improved functional mobility, able to do steps in reciprocal pattern with rail  "I'd like to continue with physical therapy, I don't think I'm ready to be done yet  L hip pain 0/10   "I think the stride is a little longer" with gait  Objective: See treatment diary below    L hip pain 0/10  Gait: SPC R hand, 3 point reciprocal steady pattern  Improved R stride length, L hip ext ROM vs SOC  TUG with SPC 17 45 sec (19 71 sec at [facility])  L hip A/PROM: flexion 102 deg, ext -10 deg (-20 deg at [facility])  ABD 17 deg (8 deg at [facility])    ER 40 deg     MMT: L iliopsoas 5/5  In sidelying: L glute med 4-/5, glute max 4/5 (Glutes 2+/5 at [facility])  Dynamic strength and balance LLE with no AD 54% of RLE via single leg step test     Precautions: L VITO    Daily Treatment Diary       Manuals 6/18/ 18 6/19 6/25 6/27 7/2 7/5 7/10 7/11 7/16 7/18   Man str ABD, ext, ER  20"x5 ea 20''x5 20''x5 20"x5 x5 x5 x5 x5 x5 (ext w L inom ant rot manually   L inom ant rot mob         G3 G3   L inom ant rot SCS         90" 90"                Exercise Diary    2/25 2/27 7/2        TA cx 10"x 15 -           Bridge  Stand Adductor str x10 x30    No str x30 x30 x30 x30 30 x30 x30    20"x5 30    x5   Clamshell L w pillow 10'X 10 x30 x30 x30 x30 30 30 30 30 30   Stand HF str  No str       No str    Wall gastroc str  20"x5 5x20'' 5x20'' 20"x5 x5 x5 x5 x5    Stand L hip march, ABD, Ext  1# x10 2x10 2x10 2x10 2# 3x10 3x10 2#  3x10 3x10 3x10   TKE with t-band  blk 3x10 3x10 3x10 3x10 Silver 3x10 3x10 3x10 3x10 3x10   T ball squat             FSU  4" x10 2x10 2x10 2x10 3x10 step overs 3x10 3x10 3x10 3x10   Nu Step  10' 10' 10' 10' 10' 10' 10' 10' 10'   Mini squat    2x10 2x10 3x10 3x10 3x10 3x10 3x10   Sit to stand    2x10 2x10 3x10 3x10 3x10 3x10    Bridge w ABD, t-band         Green 2x10 3x10   Self MET L inom post rot         5"x3 5"x3   High knees gait                                                                                                        Modalities             Ice                                          Assessment: Tolerated treatment well  Patient exhibited good technique with therapeutic exercises and would benefit from continued PT  Following MET for L SI dysfunction and manual therapy, pt able to feel L anterior hip, thigh stretch for first time when stretching hip flexors, improving ext ROM        Impairment Goals  - Pt I with initial HEP in 1-2 visits - met  - Improve ROM equal to contralateral side in 4-6 weeks - not met  - Increase strength to 5/5 in all affected areas in 4-6 week - not met    Functional Goals  - Increase Functional Status Measure to: 56 in 4-6  weeks  - not met - 50  - Patient will be independent with comprehensive HEP in 4-6 weeks - not met  - Ambulation is improved to prior level of function in 4-6 weeks - not met  - Stair climbing is improved to prior level of function in 4-6 weeks - met  - LLE dynamic strength and balance at least 90% of RLE via single leg step test, in 4-6 wks  - not met  - TUG with no AD, < 14 sec, in 4-6 wks  - not met    NEW GOALS:    Impairment Goals    - Improve ROM equal to contralateral side in 2-4 weeks  - Increase strength to 5/5 in all affected areas in 2-4 week    Functional Goals  - Increase Functional Status Measure to: 56 in 2-4 weeks  - Patient will be independent with comprehensive HEP in 2-4 weeks  - Ambulation is improved to prior level of function in 2-4 weeks  - LLE dynamic strength and balance at least 90% of RLE via single leg step test, in 2-4 wks  - TUG with AD PRN, < 14 sec, in 2-4 wks  Kitty Boyd has been compliant with attending PT and home exercise program since initial eval   Jose Finn  has made improvements in objective data since initial eval but is still limited compared to prior level of function  Jose Finn continues with above listed impairments and would benefit from additional skilled PT to address these deficits to return to prior level of function  Jose Finn has attended 10 visits, missed 0 visits  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

## 2018-07-18 NOTE — PROGRESS NOTES
Daily Note     Today's date: 2018  Patient name: Suhail Cota  : 1940  MRN: [id number]  Referring provider: Elsa Jarrett MD  Dx:   Encounter Diagnosis     ICD-10-CM    1  Left hip pain M25 552    2  Primary osteoarthritis of left hip M16 12    3  Status post total hip replacement, left [id number] 642        Start Time: 0952  Stop Time: 1100  Total time in clinic (min): 68 minutes    Subjective: pt noting improved functional mobility, able to do steps in reciprocal pattern with rail  "I'd like to continue with physical therapy, I don't think I'm ready to be done yet  L hip pain 0/10   "I think the stride is a little longer" with gait  Objective: See treatment diary below    L hip pain 0/10  Gait: SPC R hand, 3 point reciprocal steady pattern  Improved R stride length, L hip ext ROM vs SOC  TUG with SPC 17 45 sec (19 71 sec at [facility])  L hip A/PROM: flexion 102 deg, ext -10 deg (-20 deg at [facility])  ABD 17 deg (8 deg at [facility])  ER 40 deg  MMT: L iliopsoas 5/5  In sidelying: L glute med 4-/5, glute max 4/5 (Glutes 2+/5 at [facility])      Dynamic strength and balance LLE with no AD 54% of RLE via single leg step test     Precautions: L VITO    Daily Treatment Diary       Manuals 6/18/ 18 6/19 6/25 6/27 7/2 7/5 7/10 7/11 7/16 7/18   Man str ABD, ext, ER  20"x5 ea 20''x5 20''x5 20"x5 x5 x5 x5 x5 x5 (ext w L inom ant rot manually   L inom ant rot mob         G3 G3   L inom ant rot SCS         90" 90"                Exercise Diary            TA cx 10"x 15 -           Bridge  Stand Adductor str x10 x30    No str x30 x30 x30 x30 30 x30 x30    20"x5 30    x5   Clamshell L w pillow 10'X 10 x30 x30 x30 x30 30 30 30 30 30   Stand HF str  No str       No str    Wall gastroc str  20"x5 5x20'' 5x20'' 20"x5 x5 x5 x5 x5    Stand L hip march, ABD, Ext  1# x10 2x10 2x10 2x10 2# 3x10 3x10 2#  3x10 3x10 3x10   TKE with t-band  blk 3x10 3x10 3x10 3x10 Silver 3x10 3x10 3x10 3x10 3x10   T ball squat             FSU 4" x10 2x10 2x10 2x10 3x10 step overs 3x10 3x10 3x10 3x10   Nu Step  10' 10' 10' 10' 10' 10' 10' 10' 10'   Mini squat    2x10 2x10 3x10 3x10 3x10 3x10 3x10   Sit to stand    2x10 2x10 3x10 3x10 3x10 3x10    Bridge w ABD, t-band         Green 2x10 3x10   Self MET L inom post rot         5"x3 5"x3   High knees gait                                                                                                        Modalities             Ice                                          Assessment: Tolerated treatment well  Patient exhibited good technique with therapeutic exercises and would benefit from continued PT  Following MET for L SI dysfunction and manual therapy, pt able to feel L anterior hip, thigh stretch for first time when stretching hip flexors, improving ext ROM  Impairment Goals  - Pt I with initial HEP in 1-2 visits - met  - Improve ROM equal to contralateral side in 4-6 weeks - not met  - Increase strength to 5/5 in all affected areas in 4-6 week - not met    Functional Goals  - Increase Functional Status Measure to: 56 in 4-6  weeks - not met - 50  - Patient will be independent with comprehensive HEP in 4-6 weeks - not met  - Ambulation is improved to prior level of function in 4-6 weeks - not met  - Stair climbing is improved to prior level of function in 4-6 weeks - met  - LLE dynamic strength and balance at least 90% of RLE via single leg step test, in 4-6 wks  - not met  - TUG with no AD, < 14 sec, in 4-6 wks  - not met    NEW GOALS:    Impairment Goals    - Improve ROM equal to contralateral side in 2-4 weeks  - Increase strength to 5/5 in all affected areas in 2-4 week    Functional Goals  - Increase Functional Status Measure to: 56 in 2-4 weeks  - Patient will be independent with comprehensive HEP in 2-4 weeks  - Ambulation is improved to prior level of function in 2-4 weeks  - LLE dynamic strength and balance at least 90% of RLE via single leg step test, in 2-4 wks    - TUG with AD PRN, < 14 sec, in 2-4 wks  Vivian Bear has been compliant with attending PT and home exercise program since initial eval   Mali Fan  has made improvements in objective data since initial eval but is still limited compared to prior level of function  Mali Fan continues with above listed impairments and would benefit from additional skilled PT to address these deficits to return to prior level of function  Mali Fan has attended 10 visits, missed 0 visits  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

## 2018-07-23 ENCOUNTER — OFFICE VISIT (OUTPATIENT)
Dept: PHYSICAL THERAPY | Facility: CLINIC | Age: 78
End: 2018-07-23
Payer: MEDICARE

## 2018-07-23 DIAGNOSIS — Z96.642 STATUS POST TOTAL HIP REPLACEMENT, LEFT: ICD-10-CM

## 2018-07-23 DIAGNOSIS — M25.552 LEFT HIP PAIN: ICD-10-CM

## 2018-07-23 DIAGNOSIS — M16.12 PRIMARY OSTEOARTHRITIS OF LEFT HIP: Primary | ICD-10-CM

## 2018-07-23 PROCEDURE — 97110 THERAPEUTIC EXERCISES: CPT

## 2018-07-23 NOTE — PROGRESS NOTES
Daily Note     Today's date: 2018  Patient name: Suhail Cota  : 1940  MRN: [id number]  Referring provider: Elsa Jarrett MD  Dx:   Encounter Diagnosis     ICD-10-CM    1  Primary osteoarthritis of left hip M16 12    2  Left hip pain M25 552    3  Status post total hip replacement, left A22 733                   Subjective: Patient reports was working in her garden and was a little uncomfortable  Reports pain was right around a 2/10  Normally she feels weakness but not pain in the hip  Objective: See treatment diary below  LLE short in supine but ASIS equal   Precautions: L VITO    Daily Treatment Diary       Manuals    Man str ABD, ext, ER X5(ext w L inom  Ant  Rot manual         x5 (ext w L inom ant rot manually   L inom ant rot mob G3  HJ         G3   L inom ant rot SCS 90"  HJ         90"                Exercise Diary              TA cx             bridge x30              Stand Adductor str            30    x5   Clamshell L w pillow x30         30   Stand HF str             Wall gastroc str             Stand L hip march, ABD, Ext 2#  3x10         3x10   TKE with t-band 3x10         3x10   T ball squat             FSU 3x10         3x10   Nu Step 10'         10'   Mini squat 3x10         3x10   Sit to stand 3x10            Bridge w ABD, t-band G  3x10         3x10   Self MET L inom post rot 5"x3         5"x3   High knees gait                                                                                                        Modalities             Ice                                  Assessment: Tolerated treatment well  Patient challenged by program  Significant weakness of LLE expected with diagnosis but current focus on strengthening should benefit with function and mobility  Patient appears to gain good stretch of hip flexors with manual techniques  Plan: Continue per plan of care

## 2018-07-25 ENCOUNTER — OFFICE VISIT (OUTPATIENT)
Dept: PHYSICAL THERAPY | Facility: CLINIC | Age: 78
End: 2018-07-25
Payer: MEDICARE

## 2018-07-25 DIAGNOSIS — M16.12 PRIMARY OSTEOARTHRITIS OF LEFT HIP: Primary | ICD-10-CM

## 2018-07-25 DIAGNOSIS — Z96.642 STATUS POST TOTAL HIP REPLACEMENT, LEFT: ICD-10-CM

## 2018-07-25 DIAGNOSIS — M25.552 LEFT HIP PAIN: ICD-10-CM

## 2018-07-25 PROCEDURE — 97110 THERAPEUTIC EXERCISES: CPT

## 2018-07-25 NOTE — PROGRESS NOTES
Daily Note     Today's date: 2018  Patient name: Judy Espinoza  : 1940  MRN: [id number]  Referring provider: Sangeeta Mireles MD  Dx:   Encounter Diagnosis     ICD-10-CM    1  Primary osteoarthritis of left hip M16 12    2  Left hip pain M25 552    3  Status post total hip replacement, left M39 378                   Subjective: pt notes L SI joint area pain at night with sleeping in R s/L with pillow between knees  Pain currently 0/10, "about the same, that's what has me puzzled, why can't I improve on that  I pulled some weeds yesterday, I got tired, my hip was OK, I didn't push it "  Ortho f/u 18   "I'm going to talk to him (Dr Ciera Braxton) about this left leg being short  I think that's why my right knee hurts "      Objective: See treatment diary below  High knees gait attempted with no AD, with severe Trendelenberg pattern in L stance, with LOB and mod A to correct  Precautions: L VITO    Daily Treatment Diary       Manuals            Man str ABD, ext, ER X5(ext w L inom  Ant  Rot manual x5           L inom ant rot mob G3  HJ G3           L inom ant rot SCS 90"  HJ 90"                        Exercise Diary              TA cx             bridge x30 30             Stand Adductor str               Clamshell L w pillow x30 10"x 30 yellow           Stand HF str             Wall gastroc str             Stand L hip march, ABD, Ext 2#  3x10 3# 2x10           TKE with t-band 3x10 silver 3x10           T ball squat             FSU  4" 2x10           Nu Step 10' 10'           Mini squat 3x10 3x10           Sit to stand 3x10 3x10           Bridge w ABD, t-band G  3x10 NV           Self MET L inom post rot 5"x3 -           High knees gait  15'x2, CG, SPC R           Side step HHA  15'x2           Step over  4" 3x10 3x10                                                                            Modalities             Ice                                  Assessment: Tolerated treatment well   Patient would benefit from continued PT  Primary impairments affecting function at this point L glute medius weakness, lack of L hip ext ROM  Plan: Continue per plan of care

## 2018-07-30 ENCOUNTER — OFFICE VISIT (OUTPATIENT)
Dept: PHYSICAL THERAPY | Facility: CLINIC | Age: 78
End: 2018-07-30
Payer: MEDICARE

## 2018-07-30 DIAGNOSIS — Z96.642 STATUS POST TOTAL HIP REPLACEMENT, LEFT: Primary | ICD-10-CM

## 2018-07-30 PROCEDURE — 97110 THERAPEUTIC EXERCISES: CPT

## 2018-07-30 NOTE — PROGRESS NOTES
Daily Note     Today's date: 2018  Patient name: Milena Peña  : 1940  MRN: [id number]  Referring provider: Ramone Lao MD  Dx:   Encounter Diagnosis     ICD-10-CM    1  Status post total hip replacement, left X10 931                   Subjective: Pt reports that she visited the ER on Saturday due to increased edema and foot pain  Xray negative for fracture and tests negative for DVT  Pt told she has a sprain, and pt unsure of how she could have sprained her ankle  Objective: See treatment diary below  Abd and ext hip ROM  Precautions: L VITO    Daily Treatment Diary       Manuals           Man str ABD, ext, ER X5(ext w L inom  Ant  Rot manual x5 x5          L inom ant rot mob G3  HJ G3           L inom ant rot SCS 90"  HJ 90"                        Exercise Diary              TA cx             bridge x30 30             Stand Adductor str               Clamshell L w pillow x30 10"x 30 yellow 10"  30x            Stand HF str             Wall gastroc str   Strap supine  20"x5          Stand L hip march, ABD, Ext 2#  3x10 3# 2x10           TKE with t-band 3x10 silver 3x10           T ball squat             FSU  4" 2x10           Nu Step 10' 10'           Mini squat 3x10 3x10           Sit to stand 3x10 3x10           Bridge w ABD, t-band G  3x10 NV 3x10  Use B NV          Self MET L inom post rot 5"x3 -           High knees gait  15'x2, CG, SPC R           Side step HHA  15'x2           Step over  4" 3x10 3x10                                                                            Modalities             Ice                                  Assessment: Tolerated treatment well  Patient would benefit from continued PT  Session modified due to LE edema and pain in foot  Focused on stretching L hip  Plan: Continue per plan of care

## 2018-08-01 ENCOUNTER — OFFICE VISIT (OUTPATIENT)
Dept: PHYSICAL THERAPY | Facility: CLINIC | Age: 78
End: 2018-08-01
Payer: MEDICARE

## 2018-08-01 DIAGNOSIS — M25.552 LEFT HIP PAIN: ICD-10-CM

## 2018-08-01 DIAGNOSIS — Z96.642 STATUS POST TOTAL HIP REPLACEMENT, LEFT: Primary | ICD-10-CM

## 2018-08-01 DIAGNOSIS — M16.12 PRIMARY OSTEOARTHRITIS OF LEFT HIP: ICD-10-CM

## 2018-08-01 PROCEDURE — 97110 THERAPEUTIC EXERCISES: CPT

## 2018-08-01 NOTE — PROGRESS NOTES
Daily Note     Today's date: 2018  Patient name: Kristofer Samuels  : 1940  MRN: [id number]  Referring provider: Claudia Medina MD  Dx:   Encounter Diagnosis     ICD-10-CM    1  Status post total hip replacement, left Z96 642    2  Primary osteoarthritis of left hip M16 12    3  Left hip pain M25 552                   Subjective: pt had ortho f/u yesterday "he said the hip looks good, happy with my progress  He said to continue with PT if you (PT) thought I needed more strengthening  He found the spot on my foot (lateral aspect along 5th metatarsal as indicated by pt)  He said if it's not better in a few days to see a podiatrist   He said he's not a foot doctor "  Pt had f/u with Dr Archibald Limb PA  No L hip pain today  Objective: See treatment diary below  MMT peroneals strong and painfree L ankle  Level ASIS in supine      Precautions: L VITO    Daily Treatment Diary       Manuals          Man str ABD, ext, ER X5(ext w L inom  Ant  Rot manual x5 x5 x5         L inom ant rot mob G3  HJ G3 - -         L inom ant rot SCS 90"  HJ 90" - -                      Exercise Diary              TA cx             bridge x30 30             Stand Adductor str               Clamshell L w pillow x30 10"x 30 yellow 10"  30x   30         Stand HF str             Wall gastroc str   Strap supine  20"x5 x5         Stand L hip march, ABD, Ext 2#  3x10 3# 2x10           TKE with t-band 3x10 silver 3x10           T ball squat             FSU  4" 2x10           Nu Step 10' 10'  10'         Mini squat 3x10 3x10           Sit to stand 3x10 3x10           Bridge w ABD, t-band G  3x10 NV 3x10  Use B NV Blue 3x10         Self MET L inom post rot 5"x3 -           High knees gait  15'x2, CG, SPC R           Side step HHA  15'x2           Step over  4" 3x10 3x10                                                                            Modalities             Ice                                      Assessment: Tolerated treatment fair  Patient exhibited good technique with therapeutic exercises  Closed chain exercise on hold due to L foot pain, emphasis on open chain ROM, strength  L hip ext ROM improving  Plan: Continue per plan of care

## 2018-08-06 ENCOUNTER — OFFICE VISIT (OUTPATIENT)
Dept: PHYSICAL THERAPY | Facility: CLINIC | Age: 78
End: 2018-08-06
Payer: MEDICARE

## 2018-08-06 DIAGNOSIS — Z96.642 STATUS POST TOTAL HIP REPLACEMENT, LEFT: Primary | ICD-10-CM

## 2018-08-06 DIAGNOSIS — M16.12 PRIMARY OSTEOARTHRITIS OF LEFT HIP: ICD-10-CM

## 2018-08-06 DIAGNOSIS — M25.552 LEFT HIP PAIN: ICD-10-CM

## 2018-08-06 PROCEDURE — 97110 THERAPEUTIC EXERCISES: CPT

## 2018-08-06 NOTE — PROGRESS NOTES
Daily Note     Today's date: 2018  Patient name: Suri Calvo  : 1940  MRN: [id number]  Referring provider: Guerline Marcano MD  Dx:   Encounter Diagnosis     ICD-10-CM    1  Status post total hip replacement, left Z96 642    2  Primary osteoarthritis of left hip M16 12    3  Left hip pain M25 552                   Subjective: Patient reports lateral foot pain continues  Reports weight bearing exercises cause discomfort  Reports was told to see if it goes away in a week by emergency room doctor  Objective: See treatment diary below  Precautions: L VITO    Daily Treatment Diary       Manuals         Man str ABD, ext, ER X5(ext w L inom  Ant  Rot manual x5 x5 x5 x5        L inom ant rot mob G3  HJ G3 - -         L inom ant rot SCS 90"  HJ 90" - -                      Exercise Diary              TA cx             bridge x30 30             Stand Adductor str               Clamshell L w pillow x30 10"x 30 yellow 10"  30x   30 x30        Stand HF str             Wall gastroc str   Strap supine  20"x5 x5         Stand L hip march, ABD, Ext 2#  3x10 3# 2x10           TKE with t-band 3x10 silver 3x10           T ball squat             FSU  4" 2x10           Nu Step 10' 10'  10' 10'        Mini squat 3x10 3x10           Sit to stand 3x10 3x10           Bridge w ABD, t-band G  3x10 NV 3x10  Use B NV Blue 3x10 Blue  3x10        Self MET L inom post rot 5"x3 -           High knees gait  15'x2, CG, SPC R           Side step HHA  15'x2           Step over  4" 3x10 3x10                                                                            Modalities             Ice                              Assessment: Tolerated treatment fair  Patient limited by foot pain to perform weght bearing exercises  Patient progress limited by foot pain  Plan: Continue per plan of care  Monitor patient pain and adjust exercises as tolerated

## 2018-08-08 ENCOUNTER — OFFICE VISIT (OUTPATIENT)
Dept: PHYSICAL THERAPY | Facility: CLINIC | Age: 78
End: 2018-08-08
Payer: MEDICARE

## 2018-08-08 DIAGNOSIS — M16.12 PRIMARY OSTEOARTHRITIS OF LEFT HIP: ICD-10-CM

## 2018-08-08 DIAGNOSIS — Z96.642 STATUS POST TOTAL HIP REPLACEMENT, LEFT: Primary | ICD-10-CM

## 2018-08-08 DIAGNOSIS — M25.552 LEFT HIP PAIN: ICD-10-CM

## 2018-08-08 PROCEDURE — G8978 MOBILITY CURRENT STATUS: HCPCS

## 2018-08-08 PROCEDURE — 97110 THERAPEUTIC EXERCISES: CPT

## 2018-08-08 PROCEDURE — G8979 MOBILITY GOAL STATUS: HCPCS

## 2018-08-08 NOTE — PROGRESS NOTES
Daily Note     Today's date: 2018  Patient name: Carolyn Mercado  : 1940  MRN: [id number]  Referring provider: Lakshmi Evangelista MD  Dx:   Encounter Diagnosis     ICD-10-CM    1  Status post total hip replacement, left Z96 642    2  Primary osteoarthritis of left hip M16 12    3  Left hip pain M25 552                   Subjective: Patient reports feeling discomfort now from the foot up the front of leg  Reports noted it as she got out of bed this morning and started walking  Reports sitting is better for leg symptoms, worse with standing and walking  Reports discomfort was a 2/10  Reports hip is been good with only a twinge now and again that doesn't exceed 2/10  Pt concerned that LLD R longer than L not addressed at this point  Objective: See treatment diary below  Repeated lumbar ext in standing increased LLE symptoms  Repeated flexion in standing, in sitting with no effect on LLE sx  Manual traction decreased LLE sx      Precautions: L VITO    Daily Treatment Diary       Manuals        Man str ABD, ext, ER X5(ext w L inom  Ant  Rot manual x5 x5 x5 x5        L inom ant rot mob G3  HJ G3 - -         L inom ant rot SCS 90"  HJ 90" - -         Supine manual txn      30"/  10" for 8 '       Exercise Diary              TA cx             bridge x30 30             Stand Adductor str               Clamshell L w pillow x30 10"x 30 yellow 10"  30x   30 x30 Soft roll on right hip  10"x  30       Stand HF str             Wall gastroc str   Strap supine  20"x5 x5  Strap  20"x5       Stand L hip march, ABD, Ext 2#  3x10 3# 2x10    0#  3x10       TKE with t-band 3x10 silver 3x10    Solver  3x10       T ball squat             FSU  4" 2x10    4"  3x10       Nu Step 10' 10'  10' 10' 10'       Mini squat 3x10 3x10           Sit to stand 3x10 3x10           Bridge w ABD, t-band G  3x10 NV 3x10  Use B NV Blue 3x10 Blue  3x10 Blue  3x10       Self MET L inom post rot 5"x3 -           High knees gait 15'x2, CG, SPC R           Side step HHA  15'x2           Step over  4" 3x10 3x10    4"  3x10       Short sit side bending      Right  20"x5                                                           Modalities             Ice                            Assessment: Tolerated treatment well  Patient Peripheralization of LLE sx with repeated lumbar ext ROM, worse with standing and walking consistent with lumbar stenosis  Leg length discrepancy a possible contributing factor  Patient appeared to have significant relief of symptoms with manual traction and accommodations as noted on flow sheet  Patient had significant improvement in ability to perform exercises post traction  Side bending to R to open L neural foramen also tolerated well, with decreased LLE sx  Plan: Continue per plan of care  Monitor patient radicular symptoms

## 2018-08-13 ENCOUNTER — OFFICE VISIT (OUTPATIENT)
Dept: PHYSICAL THERAPY | Facility: CLINIC | Age: 78
End: 2018-08-13
Payer: MEDICARE

## 2018-08-13 DIAGNOSIS — M16.12 PRIMARY OSTEOARTHRITIS OF LEFT HIP: ICD-10-CM

## 2018-08-13 DIAGNOSIS — M25.552 LEFT HIP PAIN: ICD-10-CM

## 2018-08-13 DIAGNOSIS — Z96.642 STATUS POST TOTAL HIP REPLACEMENT, LEFT: Primary | ICD-10-CM

## 2018-08-13 PROCEDURE — 97110 THERAPEUTIC EXERCISES: CPT

## 2018-08-13 NOTE — PROGRESS NOTES
Daily Note     Today's date: 2018  Patient name: Gina Vieira  : 1940  MRN: [id number]  Referring provider: Katt Lane MD  Dx:   Encounter Diagnosis     ICD-10-CM    1  Status post total hip replacement, left Z96 642    2  Primary osteoarthritis of left hip M16 12    3  Left hip pain M25 552                   Subjective: Patient reports had minimal tingling down leg in bed one night  Reports laying on right side and adjusted herself to lay on her back and tingling went away  Reports hip is feeling great pain wise but has not been able to challenge hip because of the rain        Objective: See treatment diary below  Precautions: L VITO    Daily Treatment Diary       Manuals       Man str ABD, ext, ER X5(ext w L inom  Ant  Rot manual x5 x5 x5 x5        L inom ant rot mob G3  HJ G3 - -         L inom ant rot SCS 90"  HJ 90" - -         Supine manual txn      30"/  10" for 8 ' 8'  HJ      Exercise Diary              TA cx             bridge x30 30             Stand Adductor str               Clamshell L w pillow x30 10"x 30 yellow 10"  30x   30 x30 Soft roll on right hip  10"x  30 10"x30      Stand HF str             Wall gastroc str   Strap supine  20"x5 x5  Strap  20"x5 20"x5      Stand L hip march, ABD, Ext 2#  3x10 3# 2x10    0#  3x10 1#  3x10      TKE with t-band 3x10 silver 3x10    Silver  3x10 3x10      T ball squat             FSU  4" 2x10    4"  3x10 4"  3x10      Nu Step 10' 10'  10' 10' 10' 10'      Mini squat 3x10 3x10           Sit to stand 3x10 3x10           Bridge w ABD, t-band G  3x10 NV 3x10  Use B NV Blue 3x10 Blue  3x10 Blue  3x10 Blue  3x10      Self MET L inom post rot 5"x3 -           High knees gait  15'x2, CG, SPC R           Side step HHA  15'x2           Step over  4" 3x10 3x10    4"  3x10 4"  3x10      Short sit side bending      Right  20"x5                                                           Modalities             Ice Assessment: Tolerated treatment well  Patient continues with radicular symptoms but greatly decreased  Patient hip strength continues to be limited and continued PT to address weakness is indicated  Patient appears to be responding well to manual traction  Plan: Continue per plan of care

## 2018-08-15 ENCOUNTER — OFFICE VISIT (OUTPATIENT)
Dept: PHYSICAL THERAPY | Facility: CLINIC | Age: 78
End: 2018-08-15
Payer: MEDICARE

## 2018-08-15 ENCOUNTER — APPOINTMENT (OUTPATIENT)
Dept: PHYSICAL THERAPY | Facility: CLINIC | Age: 78
End: 2018-08-15
Payer: MEDICARE

## 2018-08-15 DIAGNOSIS — M16.12 PRIMARY OSTEOARTHRITIS OF LEFT HIP: ICD-10-CM

## 2018-08-15 DIAGNOSIS — Z96.642 STATUS POST TOTAL HIP REPLACEMENT, LEFT: Primary | ICD-10-CM

## 2018-08-15 DIAGNOSIS — M25.552 LEFT HIP PAIN: ICD-10-CM

## 2018-08-15 PROCEDURE — 97110 THERAPEUTIC EXERCISES: CPT

## 2018-08-15 NOTE — PROGRESS NOTES
Daily Note     Today's date: 8/15/2018  Patient name: Cristine Ghotra  : 1940  MRN: [id number]  Referring provider: Mariel Guevara MD  Dx:   Encounter Diagnosis     ICD-10-CM    1  Status post total hip replacement, left Z96 642    2  Primary osteoarthritis of left hip M16 12    3  Left hip pain M25 552                   Subjective: Patient reports radicular symptoms have moved out of foot and now is in shin  Reports raicular symptoms are lessening in shin  Reports feels like she is walking better        Objective: See treatment diary below  Precautions: L VITO    Daily Treatment Diary       Manuals 7/23 7/25 7/30 8/1 8/6 8/8 8/13 8/15     Man str ABD, ext, ER X5(ext w L inom  Ant  Rot manual x5 x5 x5 x5        L inom ant rot mob G3  HJ G3 - -         L inom ant rot SCS 90"  HJ 90" - -         Supine manual txn      30"/  10" for 8 ' 8'  HJ 8'  HJ     Exercise Diary              TA cx             bridge x30 30             Stand Adductor str               Clamshell L w pillow x30 10"x 30 yellow 10"  30x   30 x30 Soft roll on right hip  10"x  30 10"x30 10"x  30     Stand HF str             Wall gastroc str   Strap supine  20"x5 x5  Strap  20"x5 20"x5 20"x5     Stand L hip march, ABD, Ext 2#  3x10 3# 2x10    0#  3x10 1#  3x10 1#  3x10     TKE with t-band 3x10 silver 3x10    Silver  3x10 3x10 3x10     T ball squat             FSU  4" 2x10    4"  3x10 4"  3x10 4"  3x10     Nu Step 10' 10'  10' 10' 10' 10' 10'     Mini squat 3x10 3x10           Sit to stand 3x10 3x10           Bridge w ABD, t-band G  3x10 NV 3x10  Use B NV Blue 3x10 Blue  3x10 Blue  3x10 Blue  3x10 Blue  3x10     Self MET L inom post rot 5"x3 -           High knees gait  15'x2, CG, SPC R           Side step HHA  15'x2           Step over  4" 3x10 3x10    4"  3x10 4"  3x10 4"  3x10     Short sit side bending      Right  20"x5  Right 20"x5     Supine marching        3x10                                            Modalities             Ice Assessment: Tolerated treatment well  Patient requires some tactile cueing to properly perform clam shell execises without inducung radicular symptoms  Patient appears to have increased radicular symptoms during clam shell exercises when spine is in flexion versus extension  Patient continues with significant hip flexor weakness  Plan: Continue per plan of care

## 2018-08-20 ENCOUNTER — OFFICE VISIT (OUTPATIENT)
Dept: PHYSICAL THERAPY | Facility: CLINIC | Age: 78
End: 2018-08-20
Payer: MEDICARE

## 2018-08-20 DIAGNOSIS — M25.552 LEFT HIP PAIN: ICD-10-CM

## 2018-08-20 DIAGNOSIS — M16.12 PRIMARY OSTEOARTHRITIS OF LEFT HIP: ICD-10-CM

## 2018-08-20 DIAGNOSIS — Z96.642 STATUS POST TOTAL HIP REPLACEMENT, LEFT: Primary | ICD-10-CM

## 2018-08-20 PROCEDURE — 97140 MANUAL THERAPY 1/> REGIONS: CPT

## 2018-08-20 PROCEDURE — 97110 THERAPEUTIC EXERCISES: CPT

## 2018-08-20 NOTE — PROGRESS NOTES
Daily Note     Today's date: 2018  Patient name: Aric Roberts  : 1940  MRN: [id number]  Referring provider: Rene Nolan MD  Dx:   Encounter Diagnosis     ICD-10-CM    1  Status post total hip replacement, left Z96 642    2  Primary osteoarthritis of left hip M16 12    3  Left hip pain M25 552                   Subjective: "I'm doing pretty good  I went out in the garden and crawled around on the rocks over the weekend  I was careful "  L hip pain 0/10  Notes "slight" discomfort L lower leg, intermittent        Objective: See treatment diary below    Precautions: L VITO    Daily Treatment Diary       Manuals 7/23 7/25 7/30 8/1 8/6 8/8 8/13 8/15 8/20    Man str ABD, ext, ER X5(ext w L inom  Ant  Rot manual x5 x5 x5 x5        L inom ant rot mob G3  HJ G3 - -         L inom ant rot SCS 90"  HJ 90" - -         Supine manual txn      30"/  10" for 8 ' 8'  HJ 8'  HJ 8'    Exercise Diary              TA cx             bridge x30 30             Stand Adductor str               Clamshell L w pillow x30 10"x 30 yellow 10"  30x   30 x30 Soft roll on right hip  10"x  30 10"x30 10"x  30 30 red   Stand HF str             Wall gastroc str   Strap supine  20"x5 x5  Strap  20"x5 20"x5 20"x5 -    Stand L hip march, ABD, Ext 2#  3x10 3# 2x10    0#  3x10 1#  3x10 1#  3x10 2# 3x10    TKE with t-band 3x10 silver 3x10    Silver  3x10 3x10 3x10 3x10    T ball squat             FSU  4" 2x10    4"  3x10 4"  3x10 4"  3x10 4" 3x10    Nu Step 10' 10'  10' 10' 10' 10' 10' 10'    Mini squat 3x10 3x10           Sit to stand 3x10 3x10           Bridge w ABD, t-band G  3x10 NV 3x10  Use B NV Blue 3x10 Blue  3x10 Blue  3x10 Blue  3x10 Blue  3x10 Black 3x10    Self MET L inom post rot 5"x3 -           High knees gait  15'x2, CG, SPC R           Side step HHA  15'x2           Step over  4" 3x10 3x10    4"  3x10 4"  3x10 4"  3x10 3x10    Short sit side bending      Right  20"x5  Right 20"x5 x5    Supine marching        3x10 - Modalities             Ice                                    Assessment: Tolerated treatment well  Patient exhibited good technique with therapeutic exercises      Plan: Continue per plan of care

## 2018-08-22 ENCOUNTER — OFFICE VISIT (OUTPATIENT)
Dept: PHYSICAL THERAPY | Facility: CLINIC | Age: 78
End: 2018-08-22
Payer: MEDICARE

## 2018-08-22 DIAGNOSIS — Z96.642 STATUS POST TOTAL HIP REPLACEMENT, LEFT: Primary | ICD-10-CM

## 2018-08-22 DIAGNOSIS — M25.552 LEFT HIP PAIN: ICD-10-CM

## 2018-08-22 DIAGNOSIS — M16.12 PRIMARY OSTEOARTHRITIS OF LEFT HIP: ICD-10-CM

## 2018-08-22 PROCEDURE — G8979 MOBILITY GOAL STATUS: HCPCS

## 2018-08-22 PROCEDURE — 97110 THERAPEUTIC EXERCISES: CPT

## 2018-08-22 PROCEDURE — G8980 MOBILITY D/C STATUS: HCPCS

## 2018-08-22 PROCEDURE — 97164 PT RE-EVAL EST PLAN CARE: CPT

## 2018-08-22 NOTE — LETTER
2018    MD Stephany [phone]    Patient: Kay Westfall   YOB: 1940   Date of Visit: 2018     Encounter Diagnosis     ICD-10-CM    1  Status post total hip replacement, left Z96 642    2  Primary osteoarthritis of left hip M16 12    3  Left hip pain M25 552        Dear Dr Ji Lopez:    Please review the attached Plan of Care from [facility] recent visit  Please verify that you agree therapy should continue by signing the attached document and sending it back to our office  If you have any questions or concerns, please don't hesitate to call  Sincerely,    Sharyle Small, PT      Referring Provider:      I certify that I have read the below Plan of Care and certify the need for these services furnished under this plan of treatment while under my care  Lonza MD Fady  [facility]  [address] [place] [address] Avenue: [address] [place] [address]          PT Discharge     Today's date: 2018  Patient name: Kay Westfall  : 1940  MRN: [id number]  Referring provider: Ben Mayer MD  Dx:   Encounter Diagnosis     ICD-10-CM    1  Status post total hip replacement, left Z96 642    2  Primary osteoarthritis of left hip M16 12    3  Left hip pain M25 552                   Subjective: ortho f/u   Reports improved function, "pretty much doing what I want "  Pain L hip 0/10  Agreeable to D/C to HEP, fitness program today  Reports being on all four extremities earlier this week, "I was careful "      Objective: See treatment diary below    Re-eval:    Gait: with SPC R hand PRN, steady 3 point reciprocal pattern  Steady pattern with no AD  TUG: SPC R hand, 14 13 sec  Dynamic strength and balance LLE 76% of RLE via single leg step test     MMT in sidelying: L glute medius 4-/5, glute max 5/5  L hip A/PROM: flexion 110 deg, Ext -2 deg, ABD 20 deg, ER 67 deg          Precautions: L VITO    Daily Treatment Diary       Manuals 7/23 7/25 7/30 8/1 8/6 8/8 8/13 8/15 8/20 8/22   Man str ABD, ext, ER X5(ext w L inom  Ant  Rot manual x5 x5 x5 x5        L inom ant rot mob G3  HJ G3 - -         L inom ant rot SCS 90"  HJ 90" - -         Supine manual txn      30"/  10" for 8 ' 8'  HJ 8'  HJ 8'    Exercise Diary              TA cx             bridge x30 30             Stand Adductor str               Clamshell L w pillow x30 10"x 30 yellow 10"  30x   30 x30 Soft roll on right hip  10"x  30 10"x30 10"x  30 30 Red 30   Stand HF str             Wall gastroc str   Strap supine  20"x5 x5  Strap  20"x5 20"x5 20"x5 -    Stand L hip march, ABD, Ext 2#  3x10 3# 2x10    0#  3x10 1#  3x10 1#  3x10 2# 3x10 3x10   TKE with t-band 3x10 silver 3x10    Silver  3x10 3x10 3x10 3x10 3x10   T ball squat             FSU  4" 2x10    4"  3x10 4"  3x10 4"  3x10 4" 3x10 3x10   Nu Step 10' 10'  10' 10' 10' 10' 10' 10' 10'   Mini squat 3x10 3x10           Sit to stand 3x10 3x10           Bridge w ABD, t-band G  3x10 NV 3x10  Use B NV Blue 3x10 Blue  3x10 Blue  3x10 Blue  3x10 Blue  3x10 Black 3x10 3x10   Self MET L inom post rot 5"x3 -           High knees gait  15'x2, CG, SPC R           Side step HHA  15'x2           Step over  4" 3x10 3x10    4"  3x10 4"  3x10 4"  3x10 3x10 3x10   Short sit side bending      Right  20"x5  Right 20"x5 x5    Supine marching        3x10 -                                           Modalities             Ice                              Assessment: Tolerated treatment well  Patient exhibited good technique with therapeutic exercises     Suri Calvo has been compliant with attending PT and home exercise program since initial eval   Nai Henning  has made improvements in objective data since initial evalulation and progress has plateaued  Patient reports having returned to their prior level or function  It was mutually agreed to Discharge to home exercise program at this time    Nai Henning attended 20 visits, missed 0  NEW GOALS:     Impairment Goals    - Improve ROM equal to contralateral side in 2-4 weeks  - ROM WFL  - Increase strength to 5/5 in all affected areas in 2-4 week  - not met    Functional Goals  - Increase Functional Status Measure to: 56 in 2-4 weeks - not met, plateau at 45  - Patient will be independent with comprehensive HEP in 2-4 weeks  - met  - Ambulation is improved to prior level of function in 2-4 weeks - stable gait with AD PRN  - LLE dynamic strength and balance at least 90% of RLE via single leg step test, in 2-4 wks  - not met  - TUG with AD PRN, < 14 sec, in 2-4 wks  - not met, with SPC 14 13 sec      Plan: D/C to HEP, fitness program here

## 2018-08-22 NOTE — PROGRESS NOTES
PT Discharge     Today's date: 2018  Patient name: Gina Vieira  : 1940  MRN: [id number]  Referring provider: Katt Lane MD  Dx:   Encounter Diagnosis     ICD-10-CM    1  Status post total hip replacement, left Z96 642    2  Primary osteoarthritis of left hip M16 12    3  Left hip pain M25 552                   Subjective: ortho f/u   Reports improved function, "pretty much doing what I want "  Pain L hip 0/10  Agreeable to D/C to HEP, fitness program today  Reports being on all four extremities earlier this week, "I was careful "      Objective: See treatment diary below    Re-eval:    Gait: with SPC R hand PRN, steady 3 point reciprocal pattern  Steady pattern with no AD  TUG: SPC R hand, 14 13 sec  Dynamic strength and balance LLE 76% of RLE via single leg step test     MMT in sidelying: L glute medius 4-/5, glute max 5/5  L hip A/PROM: flexion 110 deg, Ext -2 deg, ABD 20 deg, ER 67 deg          Precautions: L VITO    Daily Treatment Diary       Manuals 7/23 7/25 7/30 8/1 8/6 8/8 8/13 8/15 8/20 8/22   Man str ABD, ext, ER X5(ext w L inom  Ant  Rot manual x5 x5 x5 x5        L inom ant rot mob G3  HJ G3 - -         L inom ant rot SCS 90"  HJ 90" - -         Supine manual txn      30"/  10" for 8 ' 8'  HJ 8'  HJ 8'    Exercise Diary              TA cx             bridge x30 30             Stand Adductor str               Clamshell L w pillow x30 10"x 30 yellow 10"  30x   30 x30 Soft roll on right hip  10"x  30 10"x30 10"x  30 30 Red 30   Stand HF str             Wall gastroc str   Strap supine  20"x5 x5  Strap  20"x5 20"x5 20"x5 -    Stand L hip march, ABD, Ext 2#  3x10 3# 2x10    0#  3x10 1#  3x10 1#  3x10 2# 3x10 3x10   TKE with t-band 3x10 silver 3x10    Silver  3x10 3x10 3x10 3x10 3x10   T ball squat             FSU  4" 2x10    4"  3x10 4"  3x10 4"  3x10 4" 3x10 3x10   Nu Step 10' 10'  10' 10' 10' 10' 10' 10' 10'   Mini squat 3x10 3x10           Sit to stand 3x10 3x10 Bridge w ABD, t-band G  3x10 NV 3x10  Use B NV Blue 3x10 Blue  3x10 Blue  3x10 Blue  3x10 Blue  3x10 Black 3x10 3x10   Self MET L inom post rot 5"x3 -           High knees gait  15'x2, CG, SPC R           Side step HHA  15'x2           Step over  4" 3x10 3x10    4"  3x10 4"  3x10 4"  3x10 3x10 3x10   Short sit side bending      Right  20"x5  Right 20"x5 x5    Supine marching        3x10 -                                           Modalities             Ice                              Assessment: Tolerated treatment well  Patient exhibited good technique with therapeutic exercises     Stephanie Joshua has been compliant with attending PT and home exercise program since initial eval   Mary Buchanan  has made improvements in objective data since initial evalulation and progress has plateaued  Patient reports having returned to their prior level or function  It was mutually agreed to Discharge to home exercise program at this time  Darrinbrandon Chanel attended 20 visits, missed 0  NEW GOALS:     Impairment Goals    - Improve ROM equal to contralateral side in 2-4 weeks - ROM WFL  - Increase strength to 5/5 in all affected areas in 2-4 week - not met    Functional Goals  - Increase Functional Status Measure to: 56 in 2-4 weeks - not met, plateau at 45  - Patient will be independent with comprehensive HEP in 2-4 weeks - met  - Ambulation is improved to prior level of function in 2-4 weeks- stable gait with AD PRN  - LLE dynamic strength and balance at least 90% of RLE via single leg step test, in 2-4 wks  - not met  - TUG with AD PRN, < 14 sec, in 2-4 wks  - not met, with SPC 14 13 sec      Plan: D/C to HEP, fitness program here

## 2018-08-23 ENCOUNTER — TRANSCRIBE ORDERS (OUTPATIENT)
Dept: PHYSICAL THERAPY | Facility: CLINIC | Age: 78
End: 2018-08-23

## 2018-08-23 DIAGNOSIS — Z96.642 STATUS POST LEFT HIP REPLACEMENT: Primary | ICD-10-CM

## 2018-08-27 ENCOUNTER — APPOINTMENT (OUTPATIENT)
Dept: PHYSICAL THERAPY | Facility: CLINIC | Age: 78
End: 2018-08-27
Payer: MEDICARE

## 2018-08-29 ENCOUNTER — APPOINTMENT (OUTPATIENT)
Dept: PHYSICAL THERAPY | Facility: CLINIC | Age: 78
End: 2018-08-29
Payer: MEDICARE

## 2018-10-01 DIAGNOSIS — F32.A DEPRESSION, UNSPECIFIED DEPRESSION TYPE: Primary | ICD-10-CM

## 2018-10-01 RX ORDER — CITALOPRAM 10 MG/1
20 TABLET ORAL DAILY
Qty: 90 TABLET | Refills: 0 | Status: SHIPPED | OUTPATIENT
Start: 2018-10-01 | End: 2019-01-06 | Stop reason: SDUPTHER

## 2018-10-04 ENCOUNTER — DOCUMENTATION (OUTPATIENT)
Dept: FAMILY MEDICINE CLINIC | Facility: CLINIC | Age: 78
End: 2018-10-04

## 2018-10-04 NOTE — PROGRESS NOTES
Patient called regarding dosage of celexa, wanted to know should she continue taking the 10 mg as was recently prescribe, or follow the instructions on the bottle,spoke to Blaine Moreno and she confirmed that patient should continue taking dosage as was prescribed by previous physcian

## 2019-01-06 DIAGNOSIS — F32.A DEPRESSION, UNSPECIFIED DEPRESSION TYPE: ICD-10-CM

## 2019-01-07 ENCOUNTER — OFFICE VISIT (OUTPATIENT)
Dept: FAMILY MEDICINE CLINIC | Facility: CLINIC | Age: 79
End: 2019-01-07
Payer: MEDICARE

## 2019-01-07 VITALS
HEIGHT: 68 IN | OXYGEN SATURATION: 98 % | BODY MASS INDEX: 25.01 KG/M2 | SYSTOLIC BLOOD PRESSURE: 100 MMHG | DIASTOLIC BLOOD PRESSURE: 60 MMHG | TEMPERATURE: 98.8 F | WEIGHT: 165 LBS | HEART RATE: 67 BPM

## 2019-01-07 DIAGNOSIS — F32.A DEPRESSION, UNSPECIFIED DEPRESSION TYPE: ICD-10-CM

## 2019-01-07 DIAGNOSIS — Z13.820 SCREENING FOR OSTEOPOROSIS: ICD-10-CM

## 2019-01-07 DIAGNOSIS — Z78.0 POST-MENOPAUSE: ICD-10-CM

## 2019-01-07 DIAGNOSIS — Z00.00 MEDICARE ANNUAL WELLNESS VISIT, SUBSEQUENT: Primary | ICD-10-CM

## 2019-01-07 PROBLEM — R19.00 PELVIC MASS IN FEMALE: Status: ACTIVE | Noted: 2018-11-30

## 2019-01-07 PROBLEM — N81.4 UTERINE PROLAPSE: Status: ACTIVE | Noted: 2018-11-30

## 2019-01-07 PROCEDURE — 99213 OFFICE O/P EST LOW 20 MIN: CPT | Performed by: PHYSICIAN ASSISTANT

## 2019-01-07 PROCEDURE — G0439 PPPS, SUBSEQ VISIT: HCPCS | Performed by: PHYSICIAN ASSISTANT

## 2019-01-07 RX ORDER — CITALOPRAM 10 MG/1
TABLET ORAL
Qty: 90 TABLET | Refills: 0 | Status: SHIPPED | OUTPATIENT
Start: 2019-01-07 | End: 2019-01-07 | Stop reason: SDUPTHER

## 2019-01-07 RX ORDER — CITALOPRAM 10 MG/1
10 TABLET ORAL DAILY
Qty: 90 TABLET | Refills: 3 | Status: SHIPPED | OUTPATIENT
Start: 2019-01-07

## 2019-01-07 RX ORDER — CITALOPRAM 10 MG/1
TABLET ORAL
Qty: 90 TABLET | Refills: 0 | OUTPATIENT
Start: 2019-01-07

## 2019-01-07 NOTE — PROGRESS NOTES
Assessment/Plan:    Medicare annual wellness visit, subsequent  Labs stable, utd with ophthalmology and advance directive planning  Past the age of most screening tests  Refuses flu and pneumonia vaccines  Counseled on the importance of calcium, vit d and weight bearing exercise for osteoporosis prevention  DEXA ordered for screening    Depression  Stable on citalopram 10 mg daily  Diagnoses and all orders for this visit:    Medicare annual wellness visit, subsequent    Depression, unspecified depression type  -     citalopram (CeleXA) 10 mg tablet; Take 1 tablet (10 mg total) by mouth daily    Screening for osteoporosis  -     DXA bone density spine hip and pelvis; Future    Post-menopause  -     DXA bone density spine hip and pelvis; Future          Subjective:      Patient ID: Esther Loaiza is a 69 y o  female  70 y/o F presents for f/u depression  She reports this has been very well controlled on citalopram 10 mg daily which she had restarted 2 years ago  Reports good mood, sleep, appetite, energy levels and no SI  She stays active around the house, at Bapt[employer], meals with friends, and 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  Tolerating med well without s/e  The following portions of the patient's history were reviewed and updated as appropriate: allergies, current medications, past family history, past medical history, past social history, past surgical history and problem list     Review of Systems   Constitutional: Negative for diaphoresis, fatigue and fever  HENT: Negative for congestion, ear pain, hearing loss, postnasal drip, rhinorrhea and sore throat  Eyes: Negative for pain, redness and visual disturbance  Respiratory: Negative for cough, shortness of breath and wheezing  Cardiovascular: Negative for chest pain, palpitations and leg swelling  Gastrointestinal: Negative for anal bleeding, constipation, diarrhea, nausea and vomiting     Endocrine: Negative for polydipsia and polyuria  Genitourinary: Negative for dysuria, flank pain and hematuria  Musculoskeletal: Negative for arthralgias, back pain, joint swelling and myalgias  Skin: Negative for pallor, rash and wound  Neurological: Negative for dizziness, syncope, numbness and headaches  Hematological: Negative for adenopathy  Does not bruise/bleed easily  Psychiatric/Behavioral: Negative for dysphoric mood, sleep disturbance and suicidal ideas  The patient is not nervous/anxious  Objective:      /60   Pulse 67   Temp 98 8 °F (37 1 °C)   Ht 5' 8" (1 727 m)   Wt 74 8 kg (165 lb)   SpO2 98%   BMI 25 09 kg/m²          Physical Exam   Constitutional: She is oriented to person, place, and time  She appears well-developed and well-nourished  No distress  HENT:   Head: Normocephalic and atraumatic  Mouth/Throat: Oropharynx is clear and moist    Eyes: Pupils are equal, round, and reactive to light  Conjunctivae and EOM are normal  Right eye exhibits no discharge  Left eye exhibits no discharge  No scleral icterus  Neck: Normal range of motion  Neck supple  No thyromegaly present  Cardiovascular: Normal rate, regular rhythm and normal heart sounds  Exam reveals no gallop and no friction rub  No murmur heard  Pulmonary/Chest: Effort normal and breath sounds normal  No respiratory distress  She has no wheezes  She has no rales  Abdominal: Soft  She exhibits no distension and no mass  There is no tenderness  There is no rebound and no guarding  Musculoskeletal: Normal range of motion  She exhibits no edema  Ambulates with cane   Lymphadenopathy:     She has no cervical adenopathy  Neurological: She is alert and oriented to person, place, and time  No cranial nerve deficit  Skin: Skin is warm and dry  No rash noted  She is not diaphoretic  No erythema  No pallor

## 2019-01-07 NOTE — PROGRESS NOTES
Assessment and Plan:  Problem List Items Addressed This Visit     Depression     Stable on citalopram 10 mg daily  Relevant Medications    citalopram (CeleXA) 10 mg tablet    Medicare annual wellness visit, subsequent - Primary     Labs stable, utd with ophthalmology and advance directive planning  Past the age of most screening tests  Refuses flu and pneumonia vaccines  Counseled on the importance of calcium, vit d and weight bearing exercise for osteoporosis prevention   DEXA ordered for screening           Other Visit Diagnoses     Screening for osteoporosis        Relevant Orders    DXA bone density spine hip and pelvis    Post-menopause        Relevant Orders    DXA bone density spine hip and pelvis        Health Maintenance Due   Topic Date Due    Medicare Annual Wellness Visit (AWV)  1940    PT PLAN OF CARE  10/04/2018         HPI:  Patient Active Problem List   Diagnosis    Primary osteoarthritis of left hip    Fatigue    Depression    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    Alcoholism, chronic ([facility] [address] )    Dyspnea on exertion    Anemia    Pelvic mass in female    Uterine prolapse    Medicare annual wellness visit, subsequent     Past Medical History:   Diagnosis Date    Anemia     Depression      Past Surgical History:   Procedure Laterality Date    HIP SURGERY       Family History   Problem Relation Age of Onset    Lung cancer Mother     Diabetes Father     Diabetes Maternal Grandmother      History   Smoking Status    Former Smoker   Smokeless Tobacco    Former User     History   Alcohol Use    Yes      History   Drug use: Unknown         Current Outpatient Prescriptions   Medication Sig Dispense Refill    citalopram (CeleXA) 10 mg tablet Take 1 tablet (10 mg total) by mouth daily 90 tablet 3    Garlic 10 MG CAPS garlic      Misc Natural Products (LUTEIN 20 PO) Take 1 tablet by mouth      Multiple Vitamins-Minerals (CENTRUM SILVER 50+WOMEN) TABS Take 1 tablet by mouth      Omega-3 Fatty Acids (FISH OIL) 1,000 mg Fish Oil      iron polysaccharides (FERREX 150) 150 mg capsule Take 325 mg by mouth      methocarbamol (ROBAXIN) 500 mg tablet Take 1 tablet (500 mg total) by mouth 4 (four) times a day for 28 days 20 tablet 0     No current facility-administered medications for this visit  Allergies   Allergen Reactions    Penicillins Other (See Comments)     CHILDHOOD       There is no immunization history on file for this patient  Patient Care Team:  Quin Nguyen PA-C as PCP - General (Family Medicine)    Medicare Screening Tests and Risk Assessments:      Health Risk Assessment:  Patient rates overall health as good  Patient feels that their physical health rating is Same  Eyesight was rated as Same  Hearing was rated as Same  Patient feels that their emotional and mental health rating is Same  Pain experienced by patient in the last 7 days has been None  Patient states that she has experienced no weight loss or gain in last 6 months  Emotional/Mental Health:  Patient has been feeling nervous/anxious  PHQ-9 Depression Screening:    Frequency of the following problems over the past two weeks:      1  Little interest or pleasure in doing things: 0 - not at all      2  Feeling down, depressed, or hopeless: 0 - not at all  PHQ-2 Score: 0          Broken Bones/Falls: Fall Risk Assessment:    In the past year, patient has experienced: No history of falling in past year          Bladder/Bowel:  Patient has leaked urine accidently in the last six months  Patient reports no loss of bowel control  Immunizations:  Patient has not had a flu vaccination within the last year  Patient has not received a pneumonia shot  Patient has not received a shingles shot  Patient has not received tetanus/diphtheria shot  Home Safety:  Patient does not have trouble with stairs inside or outside of their home     Patient currently reports that there are no safety hazards present in home, working smoke alarms, no working carbon monoxide detectors  Preventative Screenings:   Breast cancer screening performed, 12/7/2018  no colon cancer screen completed, no cholesterol screen completed, glaucoma eye exam completed, 12/7/2018      Nutrition:  Current diet: Regular with servings of the following:    Medications:  Patient is currently taking over-the-counter supplements  List of OTC medications includes: multivitamins  Patient is able to manage medications  Lifestyle Choices:  Patient reports no tobacco use  Patient has not smoked or used tobacco in the past   Patient reports alcohol use  Alcohol use per week: 7 days a week  Patient drives a vehicle  Patient wears seat belt  Current level of exercise of physical activity described by patient as: Pt twice a week for an hour  Activities of Daily Living:  Can get out of bed by his or her self, able to dress self, able to make own meals, able to do own shopping, able to bathe self, can do own laundry/housekeeping, can manage own money, pay bills and track expenses    Previous Hospitalizations:  Hospitalization or ED visit in past 12 months  Number of hospitalizations within the last year: 1-2  Additional Comments: Hip replacement 4/11/18    Advanced Directives:  Patient has decided on a power of   Patient has spoken to designated power of   Patient has completed advanced directive          Preventative Screening/Counseling:      Cardiovascular:      General: Screening Current          Diabetes:      General: Screening Current          Colorectal Cancer:      General: Screening Not Indicated          Breast Cancer:      General: Screening Current          Cervical Cancer:      General: Screening Not Indicated          Osteoporosis:      General: Risks and Benefits Discussed      Counseling: Calcium and Vitamin D Intake and Regular Weightbearing Exercise          AAA:      General: Screening Not Indicated          Glaucoma:      General: Screening Current          HIV:      General: Screening Not Indicated          Hepatitis C:      General: Screening Not Indicated        Advanced Directives:   Patient has living will for healthcare, has durable POA for healthcare, patient has an advanced directive  Information on ACP and/or AD provided  5 wishes given  End of life assessment reviewed with patient  Provider agrees with end of life decisions   Additional Comments: POCOLE- Aubrey Henao- daughter  Would like CPR    Immunizations:      Influenza: Patient Declines      Pneumococcal: Patient Declines      Shingrix: Patient Declines            No exam data present    Physical Exam:  Review of Systems   Gastrointestinal: Negative for bowel incontinence  Psychiatric/Behavioral: The patient is nervous/anxious  Vitals:    01/07/19 1145   BP: 100/60   Pulse: 67   Temp: 98 8 °F (37 1 °C)   SpO2: 98%   Weight: 74 8 kg (165 lb)   Height: 5' 8" (1 727 m)   Body mass index is 25 09 kg/m²      Physical Exam

## 2019-01-07 NOTE — ASSESSMENT & PLAN NOTE
Labs stable, utd with ophthalmology and advance directive planning  Past the age of most screening tests  Refuses flu and pneumonia vaccines  Counseled on the importance of calcium, vit d and weight bearing exercise for osteoporosis prevention   DEXA ordered for screening

## 2019-08-05 ENCOUNTER — OFFICE VISIT (OUTPATIENT)
Dept: PHYSICAL THERAPY | Facility: CLINIC | Age: 79
End: 2019-08-05
Payer: MEDICARE

## 2019-08-05 DIAGNOSIS — M79.672 LEFT FOOT PAIN: Primary | ICD-10-CM

## 2019-08-05 DIAGNOSIS — M24.552 LEFT HIP FLEXOR TIGHTNESS: ICD-10-CM

## 2019-08-05 PROCEDURE — 97110 THERAPEUTIC EXERCISES: CPT

## 2019-08-05 PROCEDURE — 97161 PT EVAL LOW COMPLEX 20 MIN: CPT

## 2019-08-05 NOTE — PROGRESS NOTES
PT Evaluation     Today's date: 2019  Patient name: Kristofer Samuels  : 1940  MRN: [id number]  Referring provider: Stacy Hylton PT  Dx:   Encounter Diagnosis     ICD-10-CM    1  Left foot pain M79 672    2  Left hip flexor tightness M24 552                   Assessment  Assessment details: Kristofer Samuels is a 67 y o  female presents with L hip tightness, s/p L VITO 2018 for revision of osteotomy  Also with likely L posterior tib tendinitis limiting L WB tolerance  Gearl Boroughs here under Direct Access  Kristofer Samuels has the above listed impairments and will benefit from skilled PT to improve deficits to return to prior level of function  Kristofer Samuels was educated on eval findings and plan for management, ice massage L foot, use of off shelf medial arch supports, stair pattern ascend with RLE first, descend LLE first   HEP initiated  Focus of PT will be on L hip as DPM did not refer pt to PT for L foot, instructing her to rest, ice  Gearl Boroughs would benefit from skilled services to improve ROM, strength, flexibility, and function, and to decrease pain  Kristofer Samuels is a 67 y o  female was evaluated on 2019 under Direct Access for Left foot pain  (primary encounter diagnosis)  Left hip flexor tightness  Kristofer Samuels has the above listed impairments and will benefit from skilled PT to improve deficits to return to prior level of function  Under direct access, the patient can be treated for 30 days without a prescription from the physician         Impairments: abnormal gait, abnormal or restricted ROM, activity intolerance, impaired balance, impaired physical strength, lacks appropriate home exercise program, pain with function and poor posture   Understanding of Dx/Px/POC: good   Prognosis: good    Goals  Impairment Goals  - Pt I with initial HEP in 1-2 visits  - Improve ROM equal to contralateral side in 4-6 weeks  - Increase strength to 5/5 in all affected areas in 4-6 week    Functional Goals  - Increase Functional Status Measure to: 54 in 4-6  weeks  - Patient will be independent with comprehensive HEP in 4-6 weeks  - Ambulation is improved to prior level of function in 4-6 weeks  - Stair climbing is improved to prior level of function in 4-6 weeks  - LLE dynamic strength and balance at least 90% of RLE via single leg step test, in 4-6 wks  - TUG with SPC < 12 sec in 4-6 wks    Plan  Patient would benefit from: skilled physical therapy  Planned modality interventions: cryotherapy  Planned therapy interventions: neuromuscular re-education, abdominal trunk stabilization, balance, strengthening, stretching, therapeutic activities, therapeutic exercise, flexibility, postural training, patient education, manual therapy and home exercise program  Frequency: 2x week  Duration in visits: 8  Duration in weeks: 4  Treatment plan discussed with: patient        Subjective Evaluation    History of Present Illness  Mechanism of injury: Pt s/p L VITO April 11, 2018 "a revision of an osteotomy I had years ago"  Received PT here post op  Chief complaint "loss of motion" left hip, "it limits me from doing a turn, I can't swing around on my leg like I'd like to, it limits my motion "  Pt here for PT under Direct Access  Also c/o L foot pain, "that's more of a bother right now than anything "  Had DPM visit 8/2/19, Dr Carlita Benitez, "they x-rayed it, it showed nothing  He thinks its a tendon, that I should ice the heck out of it, rest it, and get a support for it from the drug store  He said to mention it to you, that I was going to you for therapy since he said to stay off the foot  He said he couldn't do an injection, he thinks it's a tendon and an injection would rupture it "  Functional limitationsm: L foot pain with ambulation  Reports LLE short s/p L VITO, wears heel lift L shoe, "I wonder if that has something to do with my left foot pain "  Pt has purchased medial longitudinal arch supports with velcro attachment for L foot  L foot pain present x 2 wks, insidious onset  Pain  Current pain ratin  At best pain ratin  At worst pain ratin  Location: Medial aspect L foot at navicular, post tib insertion  Quality: burning and sharp  Relieving factors: rest  Aggravating factors: walking and standing    Social Support  Steps to enter house: yes  2  Stairs in house: yes   1  [facility]: [facility]  Lives with: Room mate  Employment status: not working (Retired)    Diagnostic Tests  X-ray: normal (L foot normal x-rays)  Treatments  Previous treatment: physical therapy (L hip PT s/p VITO)  Patient Goals  Patient goals for therapy: decreased pain, increased motion, increased strength, independence with ADLs/IADLs, improved balance and return to sport/leisure activities  Patient goal: Resume playing golf, "but that probably won't happen "        Objective    Gait: with SPC R hand, 3 point step to pattern, antalgic LLE     TUG: with SPC R hand, 16 49 sec  Palpation: L posterior tibialis tendon TTP reproducing L foot pain  Posture: L iliac crest inferior vs R  Weight shifted to R   L hip in ER  Decreased thoracic kyphosis and lumbar lordosis  L foot mid foot mild edema medial aspect  Hip A/PROM:  R: flexion 113 deg, ext -3 deg, ABD 28 deg, ER 31 deg, IR 37 deg  L: flexion 108 deg, ext -9 deg, ABD 17 deg, ER 30 deg, IR 18 deg  B knee ROM WFL grossly assessed  Ankle A/PROM:  R: DF 20 deg, PF 40 deg, ev 10 deg, inv 58 deg  L: DF 7 deg, PF 38 deg with medial foot pain, ev 12 deg, inv 40 deg  Gastroc flexibility: R +5 deg, L -8 deg  MMT: B gastroc at least 2+/5 tested NWB due to L foot pain  B anterior tib, peroneals 5/5  L posterior tibialis strong and painful, 5/5  R post tib 5/5  In sidelying: R gluteus medius, yefri 4+/5, L 3+/5        Flowsheet Rows      Most Recent Value   PT/OT G-Codes   Current Score  45   Projected Score  54   FOTO information reviewed  Yes   Assessment Type  Evaluation Precautions: L post tib tendinitis, caution with FWB    Daily Treatment Diary       Manuals 8/5/19            Man str L hip flexors, adductors nv                                                   Exercise Diary              Strap gastroc str L pull RUE>LUE 20"x5            TA cx             Bridge B to L U/L             Stand HF str             Stand hip adductor str             Clamshell             Nu Step             TKE w tband L                                                                                                                                                                                                   Modalities             Ice massage L foot

## 2019-08-09 ENCOUNTER — OFFICE VISIT (OUTPATIENT)
Dept: PHYSICAL THERAPY | Facility: CLINIC | Age: 79
End: 2019-08-09
Payer: MEDICARE

## 2019-08-09 DIAGNOSIS — M24.552 LEFT HIP FLEXOR TIGHTNESS: Primary | ICD-10-CM

## 2019-08-09 PROCEDURE — 97110 THERAPEUTIC EXERCISES: CPT

## 2019-08-09 PROCEDURE — 97112 NEUROMUSCULAR REEDUCATION: CPT

## 2019-08-09 NOTE — PROGRESS NOTES
Daily Note     Today's date: 2019  Patient name: Pratik Haley  : 1940  MRN: [id number]  Referring provider: Ian Maurice, SABINA  Dx:   Encounter Diagnosis     ICD-10-CM    1  Left hip flexor tightness M24 552                   Subjective: "the foot (left) is killing me "  Pain 6/10 medial aspect mid foot  Pt stopped wearing the medial arch support, "that made it worse  Sometimes the ice makes it worse  The hip is just stiff, I know it's weaker than the right one "  Pt suspecting leg length discrepancy s/p L VITO aggravating L foot pain  Objective: See treatment diary below  Advised pt to contact DPM for Rx for iontophoresis with dexamethasone if he concurs with its use for L post tib tendinitis  Urged pt to consult with L VITO ortho surgeon re: L foot pain, possible shoe lift modification  Precautions: L post tib tendinitis, caution with FWB    Daily Treatment Diary       Manuals 19           Man str L hip flexors, adductors nv 20"x5                                                  Exercise Diary              Strap gastroc str L pull RUE>LUE 20"x5            TA cx  10"x15           Bridge B to L U/L  B 10" x15           Stand HF str  hold           Stand hip adductor str  hold           Clamshell  10"x15           Nu Step  10'           TKE w tband L  hold                                                                                                                                                                                                 Modalities             Ice massage L foot                                        Assessment: Tolerated treatment fair  Patient would benefit from continued PT   L foot pain preventing closed chain tx  Pt may benefit from ionto with dex for L foot, lift modifications for footwear  Urged pt to f/u with appropriate clinicians  Plan: Continue per plan of care

## 2019-08-12 ENCOUNTER — OFFICE VISIT (OUTPATIENT)
Dept: PHYSICAL THERAPY | Facility: CLINIC | Age: 79
End: 2019-08-12
Payer: MEDICARE

## 2019-08-12 DIAGNOSIS — M24.552 LEFT HIP FLEXOR TIGHTNESS: Primary | ICD-10-CM

## 2019-08-12 PROCEDURE — 97112 NEUROMUSCULAR REEDUCATION: CPT

## 2019-08-12 PROCEDURE — 97110 THERAPEUTIC EXERCISES: CPT

## 2019-08-12 NOTE — PROGRESS NOTES
Daily Note     Today's date: 2019  Patient name: Jimena Concepcion  : 1940  MRN: [id number]  Referring provider: Rose Mary Butler PT  Dx:   Encounter Diagnosis     ICD-10-CM    1  Left hip flexor tightness M24 552                   Subjective: "It's just so bad, I did nothing but sit around all weekend  I did soak my foot in an epsom salt bath and that was the only thing that took the pain away while I was sitting there, but it started up again as soon as I stood on it " Pain levels 6/10 at arrival    Awaiting prescription for dexamethasone for iontophoresis  Objective: See treatment diary below    Precautions: L post tib tendinitis, caution with FWB     Daily Treatment Diary         Manuals 19                 Man str L hip flexors, adductors nv 20"x5  x5                                                                                         Exercise Diary                        Strap gastroc str L pull RUE>LUE 20"x5    hold                 TA cx   10"x15  10''x20                 Bridge B to L U/L   B 10" x15  x15                 Stand HF str   hold                   Stand hip adductor str   hold                   Clamshell   10"x15  10''x15                  Nu Step   10'  10' L4                 TKE w tband L   hold                                                                                                                                                                                                                                                                                                                                                                   Modalities                       Ice massage L foot                                                                  Assessment: Tolerated treatment fair  Patient would benefit from continued PT For improved strength, flexibility and overall function    Pt exhibits low tolerance to TE program due to L foot pain and cramping of L hip musculature throughout session  Encouraged pt to contact her doctor regarding getting iontophoresis script ASAP to help address moderately high pain levels in L foot negatively affecting her QOL  Plan: Continue per plan of care

## 2019-08-14 ENCOUNTER — OFFICE VISIT (OUTPATIENT)
Dept: PHYSICAL THERAPY | Facility: CLINIC | Age: 79
End: 2019-08-14
Payer: MEDICARE

## 2019-08-14 DIAGNOSIS — M79.672 LEFT FOOT PAIN: ICD-10-CM

## 2019-08-14 DIAGNOSIS — M24.552 LEFT HIP FLEXOR TIGHTNESS: Primary | ICD-10-CM

## 2019-08-14 PROCEDURE — 97110 THERAPEUTIC EXERCISES: CPT

## 2019-08-14 PROCEDURE — 97112 NEUROMUSCULAR REEDUCATION: CPT

## 2019-08-14 NOTE — PROGRESS NOTES
Daily Note     Today's date: 2019  Patient name: Jimena Concepcion  : 1940  MRN: [id number]  Referring provider: Rose Mary Butler PT  Dx:   Encounter Diagnosis     ICD-10-CM    1  Left hip flexor tightness M24 552    2  Left foot pain M79 672                   Subjective: "It's a little better today " Less L foot pain, 2/10  Pt arrives with dexamethasone for iontophoresis  Antalgic gait upon arrival        Objective: See treatment diary below      Precautions: L post tib tendinitis, caution with FWB     Daily Treatment Diary         Manuals 19               Man str L hip flexors, adductors nv 20"x5  x5  x5                                                                                       Exercise Diary                        Strap gastroc str L pull RUE>LUE 20"x5    hold                 TA cx   10"x15  10''x20 x20               Bridge B to L U/L   B 10" x15  x15  x20               Stand HF str   hold                   Stand hip adductor str   hold                   Clamshell   10"x15  10''x15   x20               Nu Step   10'  10' L4  10'                TKE w tband L   hold                                                                                                                                                                                                                                                                                                                                                                   Modalities                       Ice massage L foot                        iontophoresis         8'                            Assessment: Tolerated treatment well  Patient would benefit from continued PT For improved strength, flexibility and overall function  Current focus on addressing recent flare up of L foot pain  Added iontophoresis with dexamethasone; instructed pt on proper technique and to remove if she were to experience adverse reaction        Plan: Continue per plan of care

## 2019-08-19 ENCOUNTER — OFFICE VISIT (OUTPATIENT)
Dept: PHYSICAL THERAPY | Facility: CLINIC | Age: 79
End: 2019-08-19
Payer: MEDICARE

## 2019-08-19 DIAGNOSIS — M24.552 LEFT HIP FLEXOR TIGHTNESS: Primary | ICD-10-CM

## 2019-08-19 DIAGNOSIS — M79.672 LEFT FOOT PAIN: ICD-10-CM

## 2019-08-19 PROCEDURE — 97112 NEUROMUSCULAR REEDUCATION: CPT

## 2019-08-19 PROCEDURE — 97110 THERAPEUTIC EXERCISES: CPT

## 2019-08-19 NOTE — PROGRESS NOTES
Daily Note     Today's date: 2019  Patient name: Stephanie Joshua  : 1940  MRN: [id number]  Referring provider: Judie Koch PT  Dx:   Encounter Diagnosis     ICD-10-CM    1  Left hip flexor tightness M24 552    2  Left foot pain M79 672                   Subjective: Pt c/o's 2/10 L foot pain, "it burns, what is that burning?" Pt arrives using a RW today suggested by her neighbor and friend, although walker appears not properly sized  Pt WB through L foot approx 20% without heel strike  Objective: See treatment diary below      Precautions: L post tib tendinitis, caution with FWB     Daily Treatment Diary         Manuals 19             Man str L hip flexors, adductors nv 20"x5  x5  x5  x5                                                                                     Exercise Diary                        Strap gastroc str L pull RUE>LUE 20"x5    hold                 TA cx   10"x15  10''x20 x20  x30             Bridge B to L U/L   B 10" x15  x15  x20  x20             Stand HF str   hold                   Stand hip adductor str   hold                   Clamshell   10"x15  10''x15   x20  x20              Nu Step   10'  10' L4  10'   10'             TKE w tband L   hold                                                                                                                                                                                                                                                                                                                                                                   Modalities                       Ice massage L foot                        iontophoresis         8'  8'                            Assessment: Tolerated treatment fair  Patient would benefit from continued PT For improved strength, flexibility and overall function  Unable to adjust RW to proper height appropriate for patient   Pt's friend has a taller walker which she plans on using; will bring nv for adjustment  Pt has an appointment with a podiatrist tomorrow for second opinion on her L foot  Plan: Continue per plan of care

## 2019-08-21 ENCOUNTER — OFFICE VISIT (OUTPATIENT)
Dept: PHYSICAL THERAPY | Facility: CLINIC | Age: 79
End: 2019-08-21
Payer: MEDICARE

## 2019-08-21 DIAGNOSIS — M24.552 LEFT HIP FLEXOR TIGHTNESS: Primary | ICD-10-CM

## 2019-08-21 DIAGNOSIS — M79.672 LEFT FOOT PAIN: ICD-10-CM

## 2019-08-21 PROCEDURE — 97140 MANUAL THERAPY 1/> REGIONS: CPT

## 2019-08-21 PROCEDURE — 97110 THERAPEUTIC EXERCISES: CPT

## 2019-08-21 PROCEDURE — 97112 NEUROMUSCULAR REEDUCATION: CPT

## 2019-08-21 PROCEDURE — 97035 APP MDLTY 1+ULTRASOUND EA 15: CPT

## 2019-08-21 NOTE — PROGRESS NOTES
Daily Note     Today's date: 2019  Patient name: Kay Westfall  : 1940  MRN: [id number]  Referring provider: Padmini Cordero, PT  Dx:   Encounter Diagnosis     ICD-10-CM    1  Left hip flexor tightness M24 552    2  Left foot pain M79 672                   Subjective: "I saw the podiatrist yesterday, she thinks I have that tib tendon thing  She gave me an orthotic and told me to get more supportive shoes  The pain was so bad last night I woke up screaming "  Pain levels 3/10 L foot at arrival today, ambulating with antalgic gait using RW  Objective: See treatment diary below      Precautions: L post tib tendinitis, caution with FWB     Daily Treatment Diary         Manuals 19           Man str L hip flexors, adductors nv 20"x5  x5  x5  x5  x5            post tib tendon TFM            x5'                                                           Exercise Diary                        Strap gastroc str L pull RUE>LUE 20"x5    hold                 TA cx   10"x15  10''x20 x20  x30  x30           Bridge B to L U/L   B 10" x15  x15  x20  x20  x25           Stand HF str   hold                   Stand hip adductor str   hold                   Clamshell   10"x15  10''x15   x20  x20   x20           Nu Step   10'  10' L4  10'   10'  10'           TKE w tband L   hold                                                                                                                                                                                                                                                                                                                                                                   Modalities                       Ice massage L foot                        iontophoresis         8'  8'  8'           Pulsed US 50%, 3 3mHz, 1 0 w/cm2       8'                            Assessment: Tolerated treatment well   Patient would benefit from continued PT For improved strength, flexibility and overall function  Received faxed script to treat L foot  Added US and TFM to promote healing as pain levels have remained relatively unchanged and demonstrates functional deficits with gait, strength and flexibility negatively affecting QOL  Caution with stretching/exercises as pt tends to experience muscle cramping throughout session  Plan: Continue per plan of care

## 2019-08-26 ENCOUNTER — OFFICE VISIT (OUTPATIENT)
Dept: PHYSICAL THERAPY | Facility: CLINIC | Age: 79
End: 2019-08-26
Payer: MEDICARE

## 2019-08-26 DIAGNOSIS — M79.672 LEFT FOOT PAIN: Primary | ICD-10-CM

## 2019-08-26 DIAGNOSIS — M24.552 LEFT HIP FLEXOR TIGHTNESS: ICD-10-CM

## 2019-08-26 PROCEDURE — 97140 MANUAL THERAPY 1/> REGIONS: CPT

## 2019-08-26 PROCEDURE — 97035 APP MDLTY 1+ULTRASOUND EA 15: CPT

## 2019-08-26 NOTE — PROGRESS NOTES
Daily Note     Today's date: 2019  Patient name: Andre Kocher  : 1940  MRN: [id number]  Referring provider: Genevieve Silverio, PT  Dx:   Encounter Diagnosis     ICD-10-CM    1  Left foot pain M79 672    2  Left hip flexor tightness M24 552                   Subjective: "I'm trying this new shoe and it really hurts " Pt reports having just finished Prednisone medication and notes it did not help pain levels at all  States she did feel a little better after PT last visit  Pt reports pain level 9/10 at arrival today  Objective: See treatment diary below    Precautions: L post tib tendinitis, caution with FWB     Daily Treatment Diary         Manuals 19         Man str L hip flexors, adductors nv 20"x5  x5  x5  x5  x5  x5          post tib tendon TFM            x5'  x5'                                                         Exercise Diary                        Strap gastroc str L pull RUE>LUE 20"x5    hold                 TA cx   10"x15  10''x20 x20  x30  x30  -         Bridge B to L U/L   B 10" x15  x15  x20  x20  x25  -         Stand HF str   hold                   Stand hip adductor str   hold                   Clamshell   10"x15  10''x15   x20  x20   x20  -         Nu Step   10'  10' L4  10'   10'  10'  -         TKE w tband L   hold                                                                                                                                                                                                                                                                                                                                                                   Modalities                       Ice massage L foot                        iontophoresis         8'  8'  8'  8'         Pulsed US 50%, 3 3mHz, 1 0 w/cm2            8'  8'                        Assessment: Tolerated treatment fair   Patient would benefit from continued PT For improved strength, flexibility and overall function  Held all exercises and focused only on manual and modalities, trying to address high levels of pain in L foot along posterior tibialis tendon medial instep of L foot  Pt to try 3x a week in therapy, focus on modalities to see if this helps pain  Plan: Continue per plan of care

## 2019-08-28 ENCOUNTER — APPOINTMENT (OUTPATIENT)
Dept: PHYSICAL THERAPY | Facility: CLINIC | Age: 79
End: 2019-08-28
Payer: MEDICARE

## 2019-08-30 ENCOUNTER — APPOINTMENT (OUTPATIENT)
Dept: PHYSICAL THERAPY | Facility: CLINIC | Age: 79
End: 2019-08-30
Payer: MEDICARE

## 2019-09-03 ENCOUNTER — APPOINTMENT (OUTPATIENT)
Dept: PHYSICAL THERAPY | Facility: CLINIC | Age: 79
End: 2019-09-03
Payer: MEDICARE

## 2019-09-03 NOTE — PROGRESS NOTES
Pt called, left message that she is cancelling the remainder of her PT appointments  Pt reporting MRI revealed torn tendon L foot  Yu Jewell attended 7 visits, missed 3  D/C to HEP for self management

## 2019-09-04 ENCOUNTER — APPOINTMENT (OUTPATIENT)
Dept: PHYSICAL THERAPY | Facility: CLINIC | Age: 79
End: 2019-09-04
Payer: MEDICARE

## 2019-09-06 ENCOUNTER — APPOINTMENT (OUTPATIENT)
Dept: PHYSICAL THERAPY | Facility: CLINIC | Age: 79
End: 2019-09-06
Payer: MEDICARE

## 2019-09-09 ENCOUNTER — APPOINTMENT (OUTPATIENT)
Dept: PHYSICAL THERAPY | Facility: CLINIC | Age: 79
End: 2019-09-09
Payer: MEDICARE

## 2019-09-09 ENCOUNTER — TRANSCRIBE ORDERS (OUTPATIENT)
Dept: PHYSICAL THERAPY | Facility: CLINIC | Age: 79
End: 2019-09-09

## 2019-09-09 ENCOUNTER — EVALUATION (OUTPATIENT)
Dept: PHYSICAL THERAPY | Facility: CLINIC | Age: 79
End: 2019-09-09
Payer: MEDICARE

## 2019-09-09 DIAGNOSIS — S86.219A TEAR OF TIBIALIS ANTERIOR TENDON: ICD-10-CM

## 2019-09-09 DIAGNOSIS — M25.572 LEFT ANKLE PAIN, UNSPECIFIED CHRONICITY: Primary | ICD-10-CM

## 2019-09-09 PROCEDURE — 97110 THERAPEUTIC EXERCISES: CPT

## 2019-09-09 PROCEDURE — 97164 PT RE-EVAL EST PLAN CARE: CPT

## 2019-09-09 NOTE — LETTER
2019    [address],  [place] [address] [facility] [address]    Patient: Gonsalo Wagner   YOB: 1940   Date of Visit: 2019     Encounter Diagnosis     ICD-10-CM    1  Left ankle pain, unspecified chronicity M25 572    2  Tear of tibialis anterior tendon [id number]        Dear Dr Grant Goodman: Thank you for your recent referral of Gonsalo Wagner  Please review the attached evaluation summary from Ehsan's recent visit  Please verify that you agree with the plan of care by signing the attached order  If you have any questions or concerns, please do not hesitate to call  I sincerely appreciate the opportunity to share in the care of one of your patients and hope to have another opportunity to work with you in the near future  Sincerely,    Rosette Herman, PT      Referring Provider:      I certify that I have read the below Plan of Care and certify the need for these services furnished under this plan of treatment while under my care  Paige Lane,  [employer] [facility] [address] [facility] [address]  VIA Facsimile: [phone]          PT Evaluation     Today's date: 2019  Patient name: Gonsalo Wagner  : 1940  MRN: [id number]  Referring provider: Brady Christie DPM  Dx:   Encounter Diagnosis     ICD-10-CM    1  Left ankle pain, unspecified chronicity M25 572    2  Nontraumatic tear of left tibialis posterior tendon M66 872        Start Time: 1105          Assessment  Assessment details: Gonsalo Wagner is a 67 y o  female presents with L anterior tibialis tendon tear likely secondary to compensatory gait while protecting L posterior tibialis tendon as pt was recently dx and treated for posterior tibialis tendinitis  Pt with marked structural leg length discrepancy, L shorter vs Briana Hair has the above listed impairments and will benefit from skilled PT to improve deficits to return to prior level of function    Gonsalo Wagner was educated on eval findings and plan for management  HEP initiated  Stefany Wallis would benefit from skilled services to improve ROM, strength, flexibility, and function, and to decrease pain  Impairments: abnormal gait, abnormal or restricted ROM, activity intolerance, impaired balance, impaired physical strength, lacks appropriate home exercise program and pain with function  Understanding of Dx/Px/POC: good   Prognosis: good    Goals  Impairment Goals  - Pt I with initial HEP in 1-2 visits  - Improve ROM equal to contralateral side in 4-6 weeks  - Increase strength to 5/5 in all affected areas in 4-6 week    Functional Goals  - Increase Functional Status Measure to: 56 in 4-6  weeks  - Patient will be independent with comprehensive HEP in 4-6 weeks  - Ambulation is improved to prior level of function, with SPC in 4-6 weeks  - TUG with SPC, < 15 sec  Plan  Patient would benefit from: skilled physical therapy  Planned modality interventions: cryotherapy and iontophoresis  Planned therapy interventions: neuromuscular re-education, joint mobilization, manual therapy, postural training, patient education, strengthening, stretching, balance, therapeutic activities, therapeutic exercise, gait training, flexibility and home exercise program  Frequency: 2x week  Duration in visits: 8  Duration in weeks: 4  Treatment plan discussed with: patient        Subjective Evaluation    History of Present Illness  Mechanism of injury: Pt reporting insidious onset L foot pain along medial, plantar aspect beginning mid July 2019  Had DPM visit 8/2/19, Dr Lito Mason, "they x-rayed it, it showed nothing  He thinks its a tendon, that I should ice the heck out of it, rest it, and get a support for it from the drug store  He said to mention it to you, that I was going to you for therapy since he said to stay off the foot    He said he couldn't do an injection, he thinks it's a tendon and an injection would rupture it "  Functional limitations: L foot pain with ambulation  Reports LLE short s/p L VITO, wears heel lift L shoe, "I wonder if that has something to do with my left foot pain "  Pt has purchased medial longitudinal arch supports with velcro attachment for L foot  Pt seen in PT under direct access for L hip in AUG 2019  Pt then obtained PT Rx from podiatry, Dr Jami Rollins 19 for L posterior tib tendinitis and received primarily modality-based PT to include ionto with dex for post tib tendinitis  Pt was making minimal progress in PT  Pt notes late AUG 2019 pt walked to bathroom and "I had such horrible pain, it would quiver, I think that's when I tore it" with pain along dorsomedial aspect L foot  Pt received MRI revealing L anterior tibialis tear  Pt had consult with Regina Ornelas DPM who deemed pt a non-surgical candidate and referred pt to PT  Pt with [facility] f/u 19  Was issued AFO L foot for use with [facility]  Functional limitations: ambulating with wheeled walker in 3 point step to pattern  Baseline ambulation pattern prior to onset L foot pain with SPC, 3 point reciprocal pattern  Chief complaint L foot pain "not bad, not since it tore  I'd like to get back on the cane and off the walker "  Pt also seeking to obtain a PT Rx to continue for L hip and has contacted her orthopedic surgeon Dr Faith Hays for Rx  Pain  Current pain ratin  At best pain ratin  At worst pain ratin  Location: Plantar aspect, laterally L foot  Notes no dorsal or medial pain L foot  Notes L anterior tib tendon "lump, where it's torn is tender when I touch it "  Quality: dull ache and sharp  Aggravating factors: walking    Social Support  Steps to enter house: yes  1  Stairs in house: yes   1  Lives in: [facility]  Lives with: Friend living with pt with meidcal issues, they help each other      Employment status: not working (Retired)    Diagnostic Tests  MRI studies: abnormal (Tear L anterior tibialis tendon)  Treatments  Previous treatment: physical therapy  Patient Goals  Patient goals for therapy: increased strength, increased motion, decreased pain, improved balance and independence with ADLs/IADLs          Objective    Gait: with wheeled walker, 3 point step to pattern  Slightly antalgic LLE     TUG: with wheeled walker, 23 55 sec  Observation: MAFO L foot/ankle  L ankle with moderate to severe edema L lower leg anteriorly  Obvious localized swelling over L anterior tib tendon  Pes cavus B   L ankle with erythema  Obvious leg length discrepancy L shorter vs R  Palpation: L anterior tib tendon area localized edema TTP  Fig 8 girth: R 52 0 cm, L 55 5 cm  B knee A/PROM [facility] grossly assessed  Ankle A/PROM:  R: DF 8 deg, PF 53 deg, ev +6 deg, inv 63 deg  L: DF -12 deg, PF 29 deg, ev 15 deg, inv 40 deg  MMT: B gastroc at least 2+/5 tested in NWB  B peroneals, posterior tibialis 5/5 and pain free  Ankle dorsiflexion: R 5/5, L 4/5 with extensor hallucis, toe extensor compensation  Gastroc flexibility: R +5 deg, L -5 deg               Precautions: none    Daily Treatment Diary       Manuals 9/9/19            Man str L gastroc, soleus             Ankle mobs L post talar, calc ABD                                       Exercise Diary              Elev AROM DF/PF, ev/inv, cw/ccw x20 ea            Strap gastroc str pull R > L hand 20"x5            BAPS seated             T band DF             Wall gastroc str w towel roll                                                                                                                                                                                                                                          Modalities             Ionto w dex L ant tib tendon

## 2019-09-09 NOTE — PROGRESS NOTES
PT Evaluation     Today's date: 2019  Patient name: Vero Christianson  : 1940  MRN: [id number]  Referring provider: Marleny Rosario DPM  Dx:   Encounter Diagnosis     ICD-10-CM    1  Left ankle pain, unspecified chronicity M25 572    2  Nontraumatic tear of left tibialis posterior tendon M66 872        Start Time: 1105          Assessment  Assessment details: Vero Christianson is a 67 y o  female presents with L anterior tibialis tendon tear likely secondary to compensatory gait while protecting L posterior tibialis tendon as pt was recently dx and treated for posterior tibialis tendinitis  Pt with marked structural leg length discrepancy, L shorter vs Pato House has the above listed impairments and will benefit from skilled PT to improve deficits to return to prior level of function  Vero Christianson was educated on eval findings and plan for management  HEP initiated  Abdiel Vitale would benefit from skilled services to improve ROM, strength, flexibility, and function, and to decrease pain  Impairments: abnormal gait, abnormal or restricted ROM, activity intolerance, impaired balance, impaired physical strength, lacks appropriate home exercise program and pain with function  Understanding of Dx/Px/POC: good   Prognosis: good    Goals  Impairment Goals  - Pt I with initial HEP in 1-2 visits  - Improve ROM equal to contralateral side in 4-6 weeks  - Increase strength to 5/5 in all affected areas in 4-6 week    Functional Goals  - Increase Functional Status Measure to: 56 in 4-6  weeks  - Patient will be independent with comprehensive HEP in 4-6 weeks  - Ambulation is improved to prior level of function, with SPC in 4-6 weeks  - TUG with SPC, < 15 sec        Plan  Patient would benefit from: skilled physical therapy  Planned modality interventions: cryotherapy and iontophoresis  Planned therapy interventions: neuromuscular re-education, joint mobilization, manual therapy, postural training, patient education, strengthening, stretching, balance, therapeutic activities, therapeutic exercise, gait training, flexibility and home exercise program  Frequency: 2x week  Duration in visits: 8  Duration in weeks: 4  Treatment plan discussed with: patient        Subjective Evaluation    History of Present Illness  Mechanism of injury: Pt reporting insidious onset L foot pain along medial, plantar aspect beginning mid July 2019  Had DPM visit 8/2/19, Dr Mary Landis, "they x-rayed it, it showed nothing  He thinks its a tendon, that I should ice the heck out of it, rest it, and get a support for it from the drug store  He said to mention it to you, that I was going to you for therapy since he said to stay off the foot  He said he couldn't do an injection, he thinks it's a tendon and an injection would rupture it "  Functional limitations: L foot pain with ambulation  Reports LLE short s/p L VITO, wears heel lift L shoe, "I wonder if that has something to do with my left foot pain "  Pt has purchased medial longitudinal arch supports with velcro attachment for L foot  Pt seen in PT under direct access for L hip in AUG 2019  Pt then obtained PT Rx from podiatry, Dr Nuha Vieira 8/20/19 for L posterior tib tendinitis and received primarily modality-based PT to include ionto with dex for post tib tendinitis  Pt was making minimal progress in PT  Pt notes late AUG 2019 pt walked to bathroom and "I had such horrible pain, it would quiver, I think that's when I tore it" with pain along dorsomedial aspect L foot  Pt received MRI revealing L anterior tibialis tear  Pt had consult with Lisbeth Huber DPM who deemed pt a non-surgical candidate and referred pt to PT  Pt with DAY[facility] f/u 9/25/19  Was issued AFO L foot for use with [employer]  Functional limitations: ambulating with wheeled walker in 3 point step to pattern  Baseline ambulation pattern prior to onset L foot pain with SPC, 3 point reciprocal pattern    Chief complaint L foot pain "not bad, not since it tore  I'd like to get back on the cane and off the walker "  Pt also seeking to obtain a PT Rx to continue for L hip and has contacted her orthopedic surgeon Dr Con Moser for Rx  Pain  Current pain ratin  At best pain ratin  At worst pain ratin  Location: Plantar aspect, laterally L foot  Notes no dorsal or medial pain L foot  Notes L anterior tib tendon "lump, where it's torn is tender when I touch it "  Quality: dull ache and sharp  Aggravating factors: walking    Social Support  Steps to enter house: yes  1  Stairs in house: yes   1  Lives in: [facility]  Lives with: Friend living with pt with meidcal issues, they help each other  Employment status: not working (Retired)    Diagnostic Tests  MRI studies: abnormal (Tear L anterior tibialis tendon)  Treatments  Previous treatment: physical therapy  Patient Goals  Patient goals for therapy: increased strength, increased motion, decreased pain, improved balance and independence with ADLs/IADLs          Objective    Gait: with wheeled walker, 3 point step to pattern  Slightly antalgic LLE     TUG: with wheeled walker, 23 55 sec  Observation: MAFO L foot/ankle  L ankle with moderate to severe edema L lower leg anteriorly  Obvious localized swelling over L anterior tib tendon  Pes cavus B   L ankle with erythema  Obvious leg length discrepancy L shorter vs R  Palpation: L anterior tib tendon area localized edema TTP  Fig 8 girth: R 52 0 cm, L 55 5 cm  B knee A/PROM [facility] grossly assessed  Ankle A/PROM:  R: DF 8 deg, PF 53 deg, ev +6 deg, inv 63 deg  L: DF -12 deg, PF 29 deg, ev 15 deg, inv 40 deg  MMT: B gastroc at least 2+/5 tested in NWB  B peroneals, posterior tibialis 5/5 and pain free  Ankle dorsiflexion: R 5/5, L 4/5 with extensor hallucis, toe extensor compensation  Gastroc flexibility: R +5 deg, L -5 deg               Precautions: none    Daily Treatment Diary       Manuals 19 Man str L gastroc, soleus             Ankle mobs L post talar, calc ABD                                       Exercise Diary              Elev AROM DF/PF, ev/inv, cw/ccw x20 ea            Strap gastroc str pull R > L hand 20"x5            BAPS seated             T band DF             Wall gastroc str w towel roll                                                                                                                                                                                                                                          Modalities             Ionto w dex L ant tib tendon

## 2019-09-11 ENCOUNTER — APPOINTMENT (OUTPATIENT)
Dept: PHYSICAL THERAPY | Facility: CLINIC | Age: 79
End: 2019-09-11
Payer: MEDICARE

## 2019-09-11 ENCOUNTER — OFFICE VISIT (OUTPATIENT)
Dept: PHYSICAL THERAPY | Facility: CLINIC | Age: 79
End: 2019-09-11
Payer: MEDICARE

## 2019-09-11 DIAGNOSIS — M79.672 LEFT FOOT PAIN: ICD-10-CM

## 2019-09-11 DIAGNOSIS — S86.219A TEAR OF TIBIALIS ANTERIOR TENDON: ICD-10-CM

## 2019-09-11 DIAGNOSIS — M25.572 LEFT ANKLE PAIN, UNSPECIFIED CHRONICITY: Primary | ICD-10-CM

## 2019-09-11 PROCEDURE — 97140 MANUAL THERAPY 1/> REGIONS: CPT

## 2019-09-11 PROCEDURE — 97110 THERAPEUTIC EXERCISES: CPT

## 2019-09-13 ENCOUNTER — APPOINTMENT (OUTPATIENT)
Dept: PHYSICAL THERAPY | Facility: CLINIC | Age: 79
End: 2019-09-13
Payer: MEDICARE

## 2019-09-16 ENCOUNTER — TRANSCRIBE ORDERS (OUTPATIENT)
Dept: PHYSICAL THERAPY | Facility: CLINIC | Age: 79
End: 2019-09-16

## 2019-09-16 ENCOUNTER — EVALUATION (OUTPATIENT)
Dept: PHYSICAL THERAPY | Facility: CLINIC | Age: 79
End: 2019-09-16
Payer: MEDICARE

## 2019-09-16 DIAGNOSIS — Z96.642 HISTORY OF REVISION OF TOTAL REPLACEMENT OF LEFT HIP JOINT: Primary | ICD-10-CM

## 2019-09-16 PROCEDURE — 97110 THERAPEUTIC EXERCISES: CPT

## 2019-09-16 PROCEDURE — 97164 PT RE-EVAL EST PLAN CARE: CPT

## 2019-09-16 NOTE — PROGRESS NOTES
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        Start Time: 1359          Assessment  Assessment details: Sonia Culver is a 67 y o  female presents s/p L VITO revision in 2018, with structural LLD L short, recent L posterior tibialis tendinitis and L anterior tib tendon tear  Currently in PT for L ankle as well  Sonia Culver has the above listed impairments and will benefit from skilled PT to improve deficits to return to prior level of function  Sonia Culver was educated on eval findings and plan for management  HEP initiated  Deep Zamarripa would benefit from skilled services to improve ROM, strength, flexibility, and function, and to decrease pain  Impairments: abnormal gait, abnormal or restricted ROM, activity intolerance, impaired physical strength, lacks appropriate home exercise program, pain with function and poor posture   Understanding of Dx/Px/POC: good   Prognosis: good    Goals  Impairment Goals  - Pt I with initial HEP in 1-2 visits  - Improve L hip ROM to [facility] in 4-6 weeks  - Increase strength to 5/5 in all affected areas in 4-6 week    Functional Goals  - Increase Functional Status Measure to: 52 in 4-6  weeks  - Patient will be independent with comprehensive HEP in 4-6 weeks  - Ambulation steady with SPC in 4-6 weeks  - LLE dynamic strength and balance at least 90% of RLE via single leg step test, in 4-6 wks    - TUG with SPC < 15 sec in 4-6 wks      Plan  Patient would benefit from: skilled physical therapy  Planned modality interventions: cryotherapy  Planned therapy interventions: stretching, strengthening, patient education, neuromuscular re-education, manual therapy, balance, flexibility, gait training, therapeutic activities, therapeutic exercise, home exercise program and abdominal trunk stabilization  Frequency: 2x week  Duration in visits: 8  Duration in weeks: 4  Treatment plan discussed with: patient        Subjective Evaluation    History of Present Illness  Mechanism of injury: Pt s/p L VITO 2018 "a revision of an osteotomy I had years ago"  Received PT here post op  Chief complaint "loss of motion" left hip, "it limits me from doing a turn, I can't swing around on my leg like I'd like to, it limits my motion "  Pt here for PT L hip under Direct Access in AUG 2019  Also c/o L foot pain, "that's more of a bother right now than anything "  Had DPM visit 19, Dr Jim French, "they x-rayed it, it showed nothing  He thinks its a tendon, that I should ice the heck out of it, rest it, and get a support for it from the drug store  He said to mention it to you, that I was going to you for therapy since he said to stay off the foot  He said he couldn't do an injection, he thinks it's a tendon and an injection would rupture it "  During the course of PT pt with acute pain L foot along dorsomedial aspect, with MRI revealed ruptured anterior tibialis tendon  Pt currently in PT for L foot as well  Functional limitations L hip: "I still feel like it's not strong enough, I'm weak in that area "  Doing steps in step to pattern, her baseline  Pt driving  Note L groin "pulling "  "I haven't been doing a lot of physical stuff with this dumb ankle  I can't play golf, I can't do anything "  Pt notes structural leg length discrepancy LLE short vs right, with Dr Po Luke noting "it was 1 5-2" short before the surgery"  L shoe lift added post op VITO by podiatry due to LLD, L foot/ankle pain  C/C L hip "the weakness "    Pain  Current pain ratin  At best pain ratin  At worst pain rating: 3  Location: L inguinal area pulling, L thigh discomfort    Social Support  Exterior steps/ramp assessed: See prior evals for social support        Diagnostic Tests  X-ray: abnormal (See chart)  Treatments  Previous treatment: physical therapy  Patient Goals  Patient goals for therapy: increased strength, increased motion, improved balance, independence with ADLs/IADLs and return to sport/leisure activities  Patient goal: "I can't really say I have pain "        Objective     Gait: with wheeled walker, 3 point step-to pattern, steady  TUG: with wheeled walker, 18 88 sec  Observation/posture: decreased thoracic kyphosis, increased lumbar lordosis, level iliac crests in standing, B knees slightly flexed  In supine, LLE markedly short vs R  Obvious edema dorsal aspect L foot cephalad to pt sneaker  OH squat: increased trunk flexion at hips, Fair balance  Ankle ROM: see eval 9/9/19  B knee A/PROM [facility] grossly assessed  Hip A/PROM:  R: flexion 120 deg, ext 0 deg, ABD 33 deg, ER 39 deg  L: flexion 96 deg, ext -10 deg, ABD 18 deg, ER 35 deg  MMT: quads: R 5/5, L 4/5  Hamstrings 5/5 B  Glute medius: R 4/5, L 4-/5  Glute max in sidelying: R 4/5, L 3+/5              Precautions: none    Daily Treatment Diary       Manuals 9/9/19 9/11 9/16          Man str L gastroc, soleus  KAT           Ankle mobs L post talar  HJ  Plus talar distract         Post talar glide   G4           Man str L hip ext, ABD, ER   nv          Exercise Diary              Elev AROM DF/PF, ev/inv, cw/ccw x20 ea x20 ea           Strap gastroc str pull R > L hand 20"x5 x5           BAPS seated             T band DF  Yellow 2x10           Wall gastroc str w towel roll  20''x5           gastroc stretch off TM step  Off step today 20''x5           Bridge   10"x10          Clamshell L   10"x10          Stand L hip adductor str             Stand hip march, ABD, ext             Nu Step             Stand hip flexor str             TKE w t band L             LAQ L                                                                                                                     Modalities             Ionto w dex L ant tib tendon  done

## 2019-09-16 NOTE — LETTER
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 D03 722        Dear Dr Mile Crespo: Thank you for your recent referral of Kay Westfall  Please review the attached evaluation summary from Ehsan's recent visit  Please verify that you agree with the plan of care by signing the attached order  If you have any questions or concerns, please do not hesitate to call  I sincerely appreciate the opportunity to share in the care of one of your patients and hope to have another opportunity to work with you in the near future  Sincerely,    Sharyle Small, PT      Referring Provider:      I certify that I have read the below Plan of Care and certify the need for these services furnished under this plan of treatment while under my care  Gray Dickens PA-C  [address] [place] [address]  [address]   S [address]  VIA Facsimile: [phone]          PT Evaluation     Today's date: 2019  Patient name: Kay Westfall  : 1940  MRN: [id number]  Referring provider: Lori Aiken DPM  Dx:   Encounter Diagnosis     ICD-10-CM    1  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        Start Time: 1359          Assessment  Assessment details: Kay Westfall is a 67 y o  female presents s/p L VITO revision in 2018, with structural LLD L short, recent L posterior tibialis tendinitis and L anterior tib tendon tear  Currently in PT for L ankle as well  Kay Westfall has the above listed impairments and will benefit from skilled PT to improve deficits to return to prior level of function  Kay Westfall was educated on eval findings and plan for management  HEP initiated    Alline Friend would benefit from skilled services to improve ROM, strength, flexibility, and function, and to decrease pain  Impairments: abnormal gait, abnormal or restricted ROM, activity intolerance, impaired physical strength, lacks appropriate home exercise program, pain with function and poor posture   Understanding of Dx/Px/POC: good   Prognosis: good    Goals  Impairment Goals  - Pt I with initial HEP in 1-2 visits  - Improve L hip ROM to [facility] in 4-6 weeks  - Increase strength to 5/5 in all affected areas in 4-6 week    Functional Goals  - Increase Functional Status Measure to: 52 in 4-6  weeks  - Patient will be independent with comprehensive HEP in 4-6 weeks  - Ambulation steady with SPC in 4-6 weeks  - LLE dynamic strength and balance at least 90% of RLE via single leg step test, in 4-6 wks  - TUG with SPC < 15 sec in 4-6 wks      Plan  Patient would benefit from: skilled physical therapy  Planned modality interventions: cryotherapy  Planned therapy interventions: stretching, strengthening, patient education, neuromuscular re-education, manual therapy, balance, flexibility, gait training, therapeutic activities, therapeutic exercise, home exercise program and abdominal trunk stabilization  Frequency: 2x week  Duration in visits: 8  Duration in weeks: 4  Treatment plan discussed with: patient        Subjective Evaluation    History of Present Illness  Mechanism of injury: Pt s/p L VITO April 11, 2018 "a revision of an osteotomy I had years ago"  Received PT here post op  Chief complaint "loss of motion" left hip, "it limits me from doing a turn, I can't swing around on my leg like I'd like to, it limits my motion "  Pt here for PT L hip under Direct Access in AUG 2019  Also c/o L foot pain, "that's more of a bother right now than anything "  Had DPM visit 8/2/19, Dr Reece Vail, "they x-rayed it, it showed nothing  He thinks its a tendon, that I should ice the heck out of it, rest it, and get a support for it from the drug store    He said to mention it to you, that I was going to you for therapy since he said to stay off the foot  He said he couldn't do an injection, he thinks it's a tendon and an injection would rupture it "  During the course of PT pt with acute pain L foot along dorsomedial aspect, with MRI revealed ruptured anterior tibialis tendon  Pt currently in PT for L foot as well  Functional limitations L hip: "I still feel like it's not strong enough, I'm weak in that area "  Doing steps in step to pattern, her baseline  Pt driving  Note L groin "pulling "  "I haven't been doing a lot of physical stuff with this dumb ankle  I can't play golf, I can't do anything "  Pt notes structural leg length discrepancy LLE short vs right, with Dr Chaparro Reyes noting "it was 1 5-2" short before the surgery"  L shoe lift added post op VITO by podiatry due to LLD, L foot/ankle pain  C/C L hip "the weakness "    Pain  Current pain ratin  At best pain ratin  At worst pain rating: 3  Location: L inguinal area pulling, L thigh discomfort    Social Support  Exterior steps/ramp assessed: See prior evals for social support  Diagnostic Tests  X-ray: abnormal (See chart)  Treatments  Previous treatment: physical therapy  Patient Goals  Patient goals for therapy: increased strength, increased motion, improved balance, independence with ADLs/IADLs and return to sport/leisure activities  Patient goal: "I can't really say I have pain "        Objective     Gait: with wheeled walker, 3 point step-to pattern, steady  TUG: with wheeled walker, 18 88 sec  Observation/posture: decreased thoracic kyphosis, increased lumbar lordosis, level iliac crests in standing, B knees slightly flexed  In supine, LLE markedly short vs R  Obvious edema dorsal aspect L foot cephalad to pt sneaker  OH squat: increased trunk flexion at hips, Fair balance  Ankle ROM: see eval 19  B knee A/PROM [facility] grossly assessed  Hip A/PROM:  R: flexion 120 deg, ext 0 deg, ABD 33 deg, ER 39 deg    L: flexion 96 deg, ext -10 deg, ABD 18 deg, ER 35 deg  MMT: quads: R 5/5, L 4/5  Hamstrings 5/5 B  Glute medius: R 4/5, L 4-/5  Glute max in sidelying: R 4/5, L 3+/5              Precautions: none    Daily Treatment Diary       Manuals 9/9/19 9/11 9/16          Man str L gastroc, soleus  KAT           Ankle mobs L post talar  HJ  Plus talar distract         Post talar glide   G4           Man str L hip ext, ABD, ER   nv          Exercise Diary              Elev AROM DF/PF, ev/inv, cw/ccw x20 ea x20 ea           Strap gastroc str pull R > L hand 20"x5 x5           BAPS seated             T band DF  Yellow 2x10           Wall gastroc str w towel roll  20''x5           gastroc stretch off TM step  Off step today 20''x5           Bridge   10"x10          Clamshell L   10"x10          Stand L hip adductor str             Stand hip march, ABD, ext             Nu Step             Stand hip flexor str             TKE w t band L             LAQ L                                                                                                                     Modalities             Ionto w dex L ant tib tendon  done

## 2019-09-17 ENCOUNTER — OFFICE VISIT (OUTPATIENT)
Dept: PHYSICAL THERAPY | Facility: CLINIC | Age: 79
End: 2019-09-17
Payer: MEDICARE

## 2019-09-17 DIAGNOSIS — M25.572 LEFT ANKLE PAIN, UNSPECIFIED CHRONICITY: ICD-10-CM

## 2019-09-17 DIAGNOSIS — S86.219A TEAR OF TIBIALIS ANTERIOR TENDON: ICD-10-CM

## 2019-09-17 DIAGNOSIS — Z96.642 HISTORY OF REVISION OF TOTAL REPLACEMENT OF LEFT HIP JOINT: Primary | ICD-10-CM

## 2019-09-17 PROCEDURE — 97112 NEUROMUSCULAR REEDUCATION: CPT

## 2019-09-17 PROCEDURE — 97110 THERAPEUTIC EXERCISES: CPT

## 2019-09-17 PROCEDURE — 97140 MANUAL THERAPY 1/> REGIONS: CPT

## 2019-09-17 NOTE — PROGRESS NOTES
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    2  Left ankle pain, unspecified chronicity M25 572    3  Tear of tibialis anterior tendon S86 219A                   Subjective: "Not too bad actually "  Pain 1/10 plantar aspect L foot "right around the ball of the foot "      Objective: See treatment diary below  Assessment: Tolerated treatment well  Patient would benefit from continued PT   L hip ext ROM improved  Plan: Continue per plan of care  Precautions: none    Daily Treatment Diary       Manuals 19         Man str L gastroc, soleus  KAT  20"x5         Ankle mobs L post talar  HJ  Plus talar distract G4 ea           Man str L hip ext, ABD, ER   nv 20"x5         Exercise Diary              Elev AROM DF/PF, ev/inv, cw/ccw x20 ea x20 ea           Strap gastroc str pull R > L hand 20"x5 x5           BAPS seated             T band DF  Yellow 2x10           Wall gastroc str w towel roll  20''x5  x5         gastroc stretch off TM step  Off step today 20''x5  20"x5 6" step         Bridge   10"x10 x15         Clamshell L   10"x10 x15         Stand L hip adductor str    20"x5         Stand hip march, ABD, ext    2x10         Nu Step    10'         Stand hip flexor str    20"x5         TKE w t band L    nv         LAQ L    nv                                                                                                                 Modalities             Ionto w dex L ant tib tendon  done

## 2019-09-19 ENCOUNTER — OFFICE VISIT (OUTPATIENT)
Dept: PHYSICAL THERAPY | Facility: CLINIC | Age: 79
End: 2019-09-19
Payer: MEDICARE

## 2019-09-19 DIAGNOSIS — S86.219A TEAR OF TIBIALIS ANTERIOR TENDON: ICD-10-CM

## 2019-09-19 DIAGNOSIS — Z96.642 HISTORY OF REVISION OF TOTAL REPLACEMENT OF LEFT HIP JOINT: Primary | ICD-10-CM

## 2019-09-19 DIAGNOSIS — M25.572 LEFT ANKLE PAIN, UNSPECIFIED CHRONICITY: ICD-10-CM

## 2019-09-19 PROCEDURE — 97112 NEUROMUSCULAR REEDUCATION: CPT

## 2019-09-19 PROCEDURE — 97140 MANUAL THERAPY 1/> REGIONS: CPT

## 2019-09-19 PROCEDURE — 97110 THERAPEUTIC EXERCISES: CPT

## 2019-09-19 NOTE — PROGRESS NOTES
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    2  Tear of tibialis anterior tendon S86 219A    3  Left ankle pain, unspecified chronicity M25 572                   Subjective: pain 3/10 "the ball of my left foot "  Has been walking with SPC, MAFO at home  Objective: See treatment diary below  To PT ambulating with SPC R hand, no MAFO, decreased L heel strike, with initial contact on ball of foot  Verbal cues for heel toe gait, urged to use walker vs cane, MAFO if L foot painful  Compression of L metatarsal heads painful, plantar aspect L MTP joints TTP  No clicking noted with plantar/dorsal metatarsal mobilization  Assessment: Tolerated treatment fair  Patient would benefit from continued PT   Pt urged to ice, protect L foot by wearing MAFO, using wheeled walker as she is bearing weight through anterior aspect of L foot with no heel strike, irritating MTP joints  Pt educated on danger of developing Strong's neuroma if not protected  Plan: Continue per plan of care  DPM f/u next week  Precautions: none    Daily Treatment Diary       Manuals 19        Man str L gastroc, soleus  KAT  20"x5 x5        Ankle mobs L post talar  HJ  Plus talar distract G4 ea   G4        Man str L hip ext, ABD, ER   nv 20"x5 x5        Exercise Diary              Elev AROM DF/PF, ev/inv, cw/ccw x20 ea x20 ea           Strap gastroc str pull R > L hand 20"x5 x5           BAPS seated             T band DF  Yellow 2x10   nv        Wall gastroc str w towel roll  20''x5  x5 x5        gastroc stretch off TM step  Off step today 20''x5  20"x5 6" step x5 yellow stool        Bridge   10"x10 x15 x20        Clamshell L   10"x10 x15 x20        Stand L hip adductor str    20"x5 x5        Stand hip march, ABD, ext    2x10 3x10        Nu Step    10' 10'        Stand hip flexor str    20"x5 x5        TKE w t band L    nv 3x10 blk        LAQ L    nv 3# 3x10                                                                                                                Modalities             Ionto w dex L ant tib tendon  done

## 2019-09-23 ENCOUNTER — OFFICE VISIT (OUTPATIENT)
Dept: PHYSICAL THERAPY | Facility: CLINIC | Age: 79
End: 2019-09-23
Payer: MEDICARE

## 2019-09-23 DIAGNOSIS — S86.219A TEAR OF TIBIALIS ANTERIOR TENDON: ICD-10-CM

## 2019-09-23 DIAGNOSIS — Z96.642 HISTORY OF REVISION OF TOTAL REPLACEMENT OF LEFT HIP JOINT: Primary | ICD-10-CM

## 2019-09-23 DIAGNOSIS — M25.572 LEFT ANKLE PAIN, UNSPECIFIED CHRONICITY: ICD-10-CM

## 2019-09-23 PROCEDURE — 97112 NEUROMUSCULAR REEDUCATION: CPT

## 2019-09-23 PROCEDURE — 97110 THERAPEUTIC EXERCISES: CPT

## 2019-09-23 PROCEDURE — 97140 MANUAL THERAPY 1/> REGIONS: CPT

## 2019-09-23 NOTE — PROGRESS NOTES
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    2  Tear of tibialis anterior tendon S86 219A    3  Left ankle pain, unspecified chronicity M25 572                   Subjective: "It's not too bad now  I think it hurts more wearing the brace (MAFO)  "  Pain 2/10, "the ball of the (left) foot  [facility] went [facility]  in the brace and it wasn't bad "  DPM f/u  with Dr Jim Chacon  Objective: See treatment diary below  Pt reporting to PT wearing L MAFO, ambulating with wheeled walker  Assessment: Tolerated treatment well  Patient would benefit from continued PT  Decreased swelling over L ant tib tendon  DPM f/u , decreased met head pain today  Plan: Continue per plan of care  Precautions: none    Daily Treatment Diary       Manuals 19       Man str L gastroc, soleus  KAT  20"x5 x5 x5       Ankle mobs L post talar  HJ  Plus talar distract G4 ea   G4 G4       Man str L hip ext, ABD, ER   nv 20"x5 x5 x5       Exercise Diary              Elev AROM DF/PF, ev/inv, cw/ccw x20 ea x20 ea           Strap gastroc str pull R > L hand 20"x5 x5           BAPS seated             T band DF  Yellow 2x10   nv 3x10       Wall gastroc str w towel roll  20''x5  x5 x5 x5       gastroc stretch off TM step  Off step today 20''x5  20"x5 6" step x5 yellow stool Floor x5       Bridge   10"x10 x15 x20 x25       Clamshell L   10"x10 x15 x20 x25       Stand L hip adductor str    20"x5 x5 x5       Stand hip march, ABD, ext    2x10 3x10 3x10       Nu Step    10' 10' 10'       Stand hip flexor str    20"x5 x5 x5       TKE w t band L    nv 3x10 blk 3x10       LAQ L    nv 3# 3x10 3x10 Modalities             Ionto w dex L ant tib tendon  done

## 2019-09-25 ENCOUNTER — OFFICE VISIT (OUTPATIENT)
Dept: PHYSICAL THERAPY | Facility: CLINIC | Age: 79
End: 2019-09-25
Payer: MEDICARE

## 2019-09-25 DIAGNOSIS — Z96.642 HISTORY OF REVISION OF TOTAL REPLACEMENT OF LEFT HIP JOINT: Primary | ICD-10-CM

## 2019-09-25 DIAGNOSIS — S86.219A TEAR OF TIBIALIS ANTERIOR TENDON: ICD-10-CM

## 2019-09-25 DIAGNOSIS — M25.572 LEFT ANKLE PAIN, UNSPECIFIED CHRONICITY: ICD-10-CM

## 2019-09-25 PROCEDURE — 97112 NEUROMUSCULAR REEDUCATION: CPT

## 2019-09-25 PROCEDURE — 97140 MANUAL THERAPY 1/> REGIONS: CPT

## 2019-09-25 PROCEDURE — 97110 THERAPEUTIC EXERCISES: CPT

## 2019-09-25 NOTE — PROGRESS NOTES
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    2  Tear of tibialis anterior tendon S86 219A    3  Left ankle pain, unspecified chronicity M25 572                   Subjective: "I saw Dr Sonya Mora this morning, he was looking for a stress fracture but didn't find anything  He gave me this" Pt arrives with an updated script to continue to address calf tightness and limited DF motion, progress to [facility] when appropriate  Pt c/o's L foot pain, today pain localized to medial instep of foot, near her big toe, 2-3/10 at arrival  Ambulating into clinic using RW today, states "the doctor said I can go to the cane when I'm steady "       Objective: See treatment diary below      Assessment: Tolerated treatment well  Patient would benefit from continued PT  Focused on stretching and manual therapy today  Suggested pt bring her [facility] with her next time for GT  Absent heel strike with increased trunk flexion over RW; encouraged more upright posture and heel-toe step pattern  Pt tends to WB heavily on her forefoot  Plan: Continue per plan of care  Precautions: none    Daily Treatment Diary       Manuals 19      Man str L gastroc, soleus  KAT  20"x5 x5 x5 x5      Ankle mobs L post talar  HJ  Plus talar distract G4 ea   G4 G4 G4 HJ      Man str L hip ext, ABD, ER   nv 20"x5 x5 x5 x5      Exercise Diary              Elev AROM DF/PF, ev/inv, cw/ccw x20 ea x20 ea           Strap gastroc str pull R > L hand 20"x5 x5           BAPS seated             T band DF  Yellow 2x10   nv 3x10 3x10      Wall gastroc str w towel roll  20''x5  x5 x5 x5 x5       gastroc stretch off TM step  Off step today 20''x5  20"x5 6" step x5 yellow stool Floor x5 x5      Bridge   10"x10 x15 x20 x25 x25 Clamshell L   10"x10 x15 x20 x25 x25      Stand L hip adductor str    20"x5 x5 x5 x5      Stand hip march, ABD, ext    2x10 3x10 3x10 3x10       Nu Step    10' 10' 10' 10'      Stand hip flexor str    20"x5 x5 x5 x5      TKE w t band L    nv 3x10 blk 3x10 3x10      LAQ L    nv 3# 3x10 3x10 3x10                                                                                                              Modalities             Ionto w dex L ant tib tendon  done

## 2019-09-30 ENCOUNTER — OFFICE VISIT (OUTPATIENT)
Dept: PHYSICAL THERAPY | Facility: CLINIC | Age: 79
End: 2019-09-30
Payer: MEDICARE

## 2019-09-30 DIAGNOSIS — M25.572 LEFT ANKLE PAIN, UNSPECIFIED CHRONICITY: ICD-10-CM

## 2019-09-30 DIAGNOSIS — S86.219A TEAR OF TIBIALIS ANTERIOR TENDON: ICD-10-CM

## 2019-09-30 DIAGNOSIS — Z96.642 HISTORY OF REVISION OF TOTAL REPLACEMENT OF LEFT HIP JOINT: Primary | ICD-10-CM

## 2019-09-30 PROCEDURE — 97112 NEUROMUSCULAR REEDUCATION: CPT

## 2019-09-30 PROCEDURE — 97110 THERAPEUTIC EXERCISES: CPT

## 2019-09-30 PROCEDURE — 97140 MANUAL THERAPY 1/> REGIONS: CPT

## 2019-09-30 NOTE — PROGRESS NOTES
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    2  Tear of tibialis anterior tendon S86 219A    3  Left ankle pain, unspecified chronicity M25 572                   Subjective: Pain 3-4/10 along medial aspect L first metatarsal, "it's weird, it keeps on moving  I called Dr Dirk Carlson the other day, the splint was killing me  He told me to wear supportive shoes if the splint bothers me "      Objective: See treatment diary below  To PT ambulating with SPC R hand, L ankle in MAFO  Assessment: Tolerated treatment well  Patient would benefit from continued PT  Limited WB LLE during tx, emphasis on ankle DF, L hip ROM  Plan: Continue per plan of care  Precautions: none    Daily Treatment Diary       Manuals 19     Man str L gastroc, soleus  KAT  20"x5 x5 x5 x5 x10     Ankle mobs L post talar  HJ  Plus talar distract G4 ea   G4 G4 G4 HJ G4, plus calc ABD, mid-  foot pron     Man str L hip ext, ABD, ER   nv 20"x5 x5 x5 x5 x5     Exercise Diary              Elev AROM DF/PF, ev/inv, cw/ccw x20 ea x20 ea           Strap gastroc str pull R > L hand 20"x5 x5      20"x5     BAPS seated             T band DF  Yellow 2x10   nv 3x10 3x10 3x10     Wall gastroc str w towel roll  20''x5  x5 x5 x5 x5       gastroc stretch off TM step  Off step today 20''x5  20"x5 6" step x5 yellow stool Floor x5 x5 Hold today     Bridge   10"x10 x15 x20 x25 x25 x25     Clamshell L   10"x10 x15 x20 x25 x25 x25     Stand L hip adductor str    20"x5 x5 x5 x5 Hold today     Stand hip march, ABD, ext    2x10 3x10 3x10 3x10  3x10     Nu Step    10' 10' 10' 10' 10'     Stand hip flexor str    20"x5 x5 x5 x5 Hold today     TKE w t band L    nv 3x10 blk 3x10 3x10 3x10     LAQ L    nv 3# 3x10 3x10 3x10 3x10                                                                                                             Modalities             Ionto w dex L ant tib tendon  done

## 2019-10-02 ENCOUNTER — OFFICE VISIT (OUTPATIENT)
Dept: PHYSICAL THERAPY | Facility: CLINIC | Age: 79
End: 2019-10-02
Payer: MEDICARE

## 2019-10-02 DIAGNOSIS — Z96.642 HISTORY OF REVISION OF TOTAL REPLACEMENT OF LEFT HIP JOINT: Primary | ICD-10-CM

## 2019-10-02 DIAGNOSIS — M25.572 LEFT ANKLE PAIN, UNSPECIFIED CHRONICITY: ICD-10-CM

## 2019-10-02 DIAGNOSIS — M79.672 LEFT FOOT PAIN: ICD-10-CM

## 2019-10-02 DIAGNOSIS — S86.219A TEAR OF TIBIALIS ANTERIOR TENDON: ICD-10-CM

## 2019-10-02 PROCEDURE — 97112 NEUROMUSCULAR REEDUCATION: CPT

## 2019-10-02 PROCEDURE — 97110 THERAPEUTIC EXERCISES: CPT

## 2019-10-02 PROCEDURE — 97140 MANUAL THERAPY 1/> REGIONS: CPT

## 2019-10-02 NOTE — PROGRESS NOTES
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    2  Tear of tibialis anterior tendon S86 219A    3  Left ankle pain, unspecified chronicity M25 572    4  Left foot pain M79 672                   Subjective: "Oh it's the same, still hurts on the arch up to my big toe " Pain level at arrival -3/10  "I'm wearing these shoes, they're the in-between pair, they're not real stiff but not real flimsy  I can tolerate these "       Objective: See treatment diary below      Assessment: Tolerated treatment well  Patient would benefit from continued PT For improved strength, flexibility and overall function  Pt continues to ambulate without heel strike until verbally cued  Held standing gastroc stretching again today as this tends to really aggravate foot sx  Plan: Continue per plan of care  Precautions: none    Daily Treatment Diary       Manuals 19 10    Man str L gastroc, soleus  KAT  20"x5 x5 x5 x5 x10 x10 HJ    Ankle mobs L post talar  HJ  Plus talar distract G4 ea   G4 G4 G4 HJ G4, plus calc ABD, mid-  foot pron G4 HJ    Man str L hip ext, ABD, ER   nv 20"x5 x5 x5 x5 x5 x5    Exercise Diary              Elev AROM DF/PF, ev/inv, cw/ccw x20 ea x20 ea           Strap gastroc str pull R > L hand 20"x5 x5      20"x5 x5    BAPS seated             T band DF  Yellow 2x10   nv 3x10 3x10 3x10 Red 2x10    Wall gastroc str w towel roll  20''x5  x5 x5 x5 x5       gastroc stretch off TM step  Off step today 20''x5  20"x5 6" step x5 yellow stool Floor x5 x5 Hold today     Bridge   10"x10 x15 x20 x25 x25 x25 x25    Clamshell L   10"x10 x15 x20 x25 x25 x25 x25    Stand L hip adductor str    20"x5 x5 x5 x5 Hold today     Stand hip march, ABD, ext    2x10 3x10 3x10 3x10  3x10 3x10    Nu Step    10' 10' 10' 10' 10' 10' L8    Stand hip flexor str    20"x5 x5 x5 x5 Hold today     TKE w t band L    nv 3x10 blk 3x10 3x10 3x10 3x10    LAQ L    nv 3# 3x10 3x10 3x10 3x10 3# 3x10                                                                                                            Modalities             Ionto w dex L ant tib tendon  done

## 2019-10-07 ENCOUNTER — EVALUATION (OUTPATIENT)
Dept: PHYSICAL THERAPY | Facility: CLINIC | Age: 79
End: 2019-10-07
Payer: MEDICARE

## 2019-10-07 ENCOUNTER — TRANSCRIBE ORDERS (OUTPATIENT)
Dept: PHYSICAL THERAPY | Facility: CLINIC | Age: 79
End: 2019-10-07

## 2019-10-07 DIAGNOSIS — Z96.642 HISTORY OF REVISION OF TOTAL REPLACEMENT OF LEFT HIP JOINT: Primary | ICD-10-CM

## 2019-10-07 DIAGNOSIS — M25.572 LEFT ANKLE PAIN, UNSPECIFIED CHRONICITY: ICD-10-CM

## 2019-10-07 DIAGNOSIS — S86.219A TEAR OF TIBIALIS ANTERIOR TENDON: ICD-10-CM

## 2019-10-07 PROCEDURE — 97140 MANUAL THERAPY 1/> REGIONS: CPT

## 2019-10-07 PROCEDURE — 97164 PT RE-EVAL EST PLAN CARE: CPT

## 2019-10-07 PROCEDURE — 97112 NEUROMUSCULAR REEDUCATION: CPT

## 2019-10-07 PROCEDURE — 97110 THERAPEUTIC EXERCISES: CPT

## 2019-10-07 NOTE — PROGRESS NOTES
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    2  Tear of tibialis anterior tendon S86 219A    3  Left ankle pain, unspecified chronicity M25 572        Start Time: 1021  Stop Time: 1139  Total time in clinic (min): 78 minutes    Subjective: current pain 2/10 L mid and fore foot medial aspect, "yesterday it was 5/10, I don't know why "  RE: functional mobility "I don't think it's helped  One day I get around better and the next I don't  Yesterday the ankle was swollen more "  Podiatry f/u 10/9/19  Objective: See treatment diary below    Pain:   L hip 0/10  L ankle/foot 0-5/10 medial aspect mid and forefoot  Palpation: L 1st metatarsal ray TTP, reproducing medial foot pain  L ankle FOTO functional score 42, projected score 56  Score at eval 35  Higher score = higher function  L hip FOTO functional score 46, projected score 52  Score at eval 42  Higher score = higher function  Gait: SPC R hand, 3 point reciprocal pattern, mildly antalgic LLE with decreased L heel strike and toe off     TUG: SPC R hand 22 10 sec  (L hip eval : 18 88 sec with wheeled walker)  L hip A/PROM: flexion 109 deg, ext -5 deg (-10 deg at eval), ABD 22 deg, ER 49 deg (35 deg at eval)    L ankle A/PROM: DF -3 deg (-12 deg at eval), PF 43 deg (29 deg at eval), ev 15 deg, inv 40 deg  MMT: L ankle DF, ev, inv 5/5  Gastroc in FWB unable to raise heel; MMT gastroc 2+/5  In sidelying: L glute med, max 4/5  L quads 5/5  LLE dynamic strength and balance 91% of RLE via single leg step test using R SPC  Assessment: Tolerated treatment well   Patient would benefit from continued PT     Deysi Olmstead has been compliant with attending PT and home exercise program since initial eval   Oneal Golden  has made improvements in objective data since initial eval but is still limited compared to prior level of function  Ney Obrgeon continues with above listed impairments and would benefit from additional skilled PT to address these deficits to return to prior level of function  Ney Obregon has attended 10 visits, missed 0 visits  Slow progress  Pain along L 1st metatarsal shaft limiting closed chain exercise tolerance and progression  Podiatry f/u 10/9/19  Goals - L ankle  Impairment Goals  - Pt I with initial HEP in 1-2 visits - met  - Improve ROM equal to contralateral side in 4-6 weeks - not met  - Increase strength to 5/5 in all affected areas in 4-6 week - not met    Functional Goals  - Increase Functional Status Measure to: 56 in 4-6  weeks - not met  - Patient will be independent with comprehensive HEP in 4-6 weeks - not met  - Ambulation is improved to prior level of function, with SPC in 4-6 weeks - not met  - TUG with SPC, < 15 sec  - not met    Goals - L hip  Impairment Goals  - Pt I with initial HEP in 1-2 visits - met  - Improve L hip ROM to [facility] in 4-6 weeks - not met  - Increase strength to 5/5 in all affected areas in 4-6 week - not met    Functional Goals  - Increase Functional Status Measure to: 52 in 4-6  weeks - not met  - Patient will be independent with comprehensive HEP in 4-6 weeks - met  - Ambulation steady with SPC in 4-6 weeks - met  - LLE dynamic strength and balance at least 90% of RLE via single leg step test, in 4-6 wks   - met with SPC  - TUG with SPC < 15 sec in 4-6 wks - not met    NEW GOALS:    New Goals - L ankle  Impairment Goals  - Improve DF ROM to at least +5 deg in 4-6 weeks  - Increase L gastroc strength in FWB to at least 3/5 in 4-6 week    Functional Goals  - Increase Functional Status Measure to: 56 in 4-6  weeks  - Patient will be independent with comprehensive HEP in 4-6 weeks  - Ambulation tolerance is improved to prior level of function, with SPC in 4-6 weeks  - TUG with SPC, < 15 sec  New Goals - L hip  Impairment Goals  - Improve L hip ext ROM to 0 deg  in 4-6 weeks  - Increase strength to 5/5 in all affected areas in 4-6 week    Functional Goals  - Increase Functional Status Measure to: 52 in 4-6  weeks  - LLE dynamic strength and balance at least 90% of RLE via single leg step test, in 4-6 wks  , with no AD   - TUG with SPC < 15 sec in 4-6 wks        Plan: Continue per plan of care  Precautions: none    Daily Treatment Diary       Manuals 9/9/19 9/11 9/16 9/17 9/19 9/23 9/25 9/30 10/2 10/7   Man str L gastroc, soleus  KAT  20"x5 x5 x5 x5 x10 x10 HJ x10   Ankle mobs L post talar  HJ  Plus talar distract G4 ea   G4 G4 G4 HJ G4, plus calc ABD, mid-  foot pron G4 HJ G4   Man str L hip ext, ABD, ER   nv 20"x5 x5 x5 x5 x5 x5 x5   Exercise Diary              Elev AROM DF/PF, ev/inv, cw/ccw x20 ea x20 ea           Strap gastroc str pull R > L hand 20"x5 x5      20"x5 x5 x5   BAPS seated             T band DF  Yellow 2x10   nv 3x10 3x10 3x10 Red 2x10 nv   Wall gastroc str w towel roll  20''x5  x5 x5 x5 x5       gastroc stretch off TM step  Off step today 20''x5  20"x5 6" step x5 yellow stool Floor x5 x5 Hold      Bridge   10"x10 x15 x20 x25 x25 x25 x25 L U/L 10"x15   Clamshell L   10"x10 x15 x20 x25 x25 x25 x25 x30 (yel- low nv)   Stand L hip adductor str    20"x5 x5 x5 x5 Hold today     Stand hip march, ABD, ext    2x10 3x10 3x10 3x10  3x10 3x10 1# 3x10   Nu Step    10' 10' 10' 10' 10' 10' L8 10'   Stand hip flexor str    20"x5 x5 x5 x5 Hold today     TKE w t band L    nv 3x10 blk 3x10 3x10 3x10 3x10 nv   LAQ L    nv 3# 3x10 3x10 3x10 3x10 3# 3x10 D/c                                                                                                           Modalities             Ionto w dex L ant tib tendon  done

## 2019-10-07 NOTE — LETTER
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    2  Tear of tibialis anterior tendon S86 219A    3  Left ankle pain, unspecified chronicity M25 572        Dear Dr Fletcher Matthews: Thank you for your recent referral of Liu Lawrence  Please review the attached evaluation summary from Ehsan's recent visit  Please verify that you agree with the plan of care by signing the attached order  If you have any questions or concerns, please do not hesitate to call  I sincerely appreciate the opportunity to share in the care of one of your patients and hope to have another opportunity to work with you in the near future  Sincerely,    Morales Alcala, PT      Referring Provider:      I certify that I have read the below Plan of Care and certify the need for these services furnished under this plan of treatment while under my care  Myra Hathaway DPM  [phone]  [employer]   [employer]  VIA Fac[employer]ile: [phone]          PT RE-EVALUATION    Today's date: 10/7/2019  Patient name: Liu Lawrence  : 1940  MRN: [id number]  Referring provider: Sara Chen DPM  Dx:   Encounter Diagnosis     ICD-10-CM    1  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    2  Tear of tibialis anterior tendon S86 219A    3  Left ankle pain, unspecified chronicity M25 572        Start Time: 1021  Stop Time: 1139  Total time in clinic (min): 78 minutes    Subjective: current pain 2/10 L mid and fore foot medial aspect, "yesterday it was 5/10, I don't know why "  RE: functional mobility "I don't think it's helped  One day I get around better and the next I don't  Yesterday the ankle was swollen more "  Podiatry f/u 10/9/19          Objective: See treatment diary below    Pain:   L hip 0/10  L ankle/foot 0-5/10 medial aspect mid and forefoot  Palpation: L 1st metatarsal ray TTP, reproducing medial foot pain  L ankle FOTO functional score 42, projected score 56  Score at eval 35  Higher score = higher function  L hip FOTO functional score 46, projected score 52  Score at eval 42  Higher score = higher function  Gait: SPC R hand, 3 point reciprocal pattern, mildly antalgic LLE with decreased L heel strike and toe off     TUG: SPC R hand 22 10 sec  (L hip eval 9/16: 18 88 sec with wheeled walker)  L hip A/PROM: flexion 109 deg, ext -5 deg (-10 deg at eval), ABD 22 deg, ER 49 deg (35 deg at eval)    L ankle A/PROM: DF -3 deg (-12 deg at eval), PF 43 deg (29 deg at eval), ev 15 deg, inv 40 deg  MMT: L ankle DF, ev, inv 5/5  Gastroc in FWB unable to raise heel; MMT gastroc 2+/5  In sidelying: L glute med, max 4/5  L quads 5/5  LLE dynamic strength and balance 91% of RLE via single leg step test using R [facility]  Assessment: Tolerated treatment well  Patient would benefit from continued PT     Latoya Phillip has been compliant with attending PT and home exercise program since initial eval   Barbra Cano  has made improvements in objective data since initial eval but is still limited compared to prior level of function  Barrba Cano continues with above listed impairments and would benefit from additional skilled PT to address these deficits to return to prior level of function  Barbra Cano has attended 10 visits, missed 0 visits  Slow progress  Pain along L 1st metatarsal shaft limiting closed chain exercise tolerance and progression  Podiatry f/u 10/9/19        Goals - L ankle  Impairment Goals  - Pt I with initial HEP in 1-2 visits - met  - Improve ROM equal to contralateral side in 4-6 weeks - not met  - Increase strength to 5/5 in all affected areas in 4-6 week - not met    Functional Goals  - Increase Functional Status Measure to: 56 in 4-6  weeks  - not met  - Patient will be independent with comprehensive HEP in 4-6 weeks - not met  - Ambulation is improved to prior level of function, with SPC in 4-6 weeks - not met  - TUG with SPC, < 15 sec  - not met    Goals - L hip  Impairment Goals  - Pt I with initial HEP in 1-2 visits - met  - Improve L hip ROM to [facility] in 4-6 weeks - not met  - Increase strength to 5/5 in all affected areas in 4-6 week - not met    Functional Goals  - Increase Functional Status Measure to: 52 in 4-6  weeks  - not met  - Patient will be independent with comprehensive HEP in 4-6 weeks - met  - Ambulation steady with SPC in 4-6 weeks - met  - LLE dynamic strength and balance at least 90% of RLE via single leg step test, in 4-6 wks  - met with SPC  - TUG with SPC < 15 sec in 4-6 wks - not met    NEW GOALS:    New Goals - L ankle  Impairment Goals  - Improve DF ROM to at least +5 deg in 4-6 weeks  - Increase L gastroc strength in FWB to at least 3/5 in 4-6 week    Functional Goals  - Increase Functional Status Measure to: 56 in 4-6  weeks  - Patient will be independent with comprehensive HEP in 4-6 weeks  - Ambulation tolerance is improved to prior level of function, with SPC in 4-6 weeks  - TUG with SPC, < 15 sec  New Goals - L hip  Impairment Goals  - Improve L hip ext ROM to 0 deg  in 4-6 weeks  - Increase strength to 5/5 in all affected areas in 4-6 week    Functional Goals  - Increase Functional Status Measure to: 52 in 4-6  weeks  - LLE dynamic strength and balance at least 90% of RLE via single leg step test, in 4-6 wks  , with no AD   - TUG with SPC < 15 sec in 4-6 wks        Plan: Continue per plan of care  Precautions: none    Daily Treatment Diary       Manuals 9/9/19 9/11 9/16 9/17 9/19 9/23 9/25 9/30 10/2 10/7   Man str L gastroc, soleus  KAT  20"x5 x5 x5 x5 x10 x10 HJ x10   Ankle mobs L post talar  HJ  Plus talar distract G4 ea   G4 G4 G4 HJ G4, plus calc ABD, mid-  foot pron G4 HJ G4   Man str L hip ext, ABD, ER   nv 20"x5 x5 x5 x5 x5 x5 x5   Exercise Diary              Elev AROM DF/PF, ev/inv, cw/ccw x20 ea x20 ea           Strap gastroc str pull R > L hand 20"x5 x5      20"x5 x5 x5   BAPS seated             T band DF  Yellow 2x10   nv 3x10 3x10 3x10 Red 2x10 nv   Wall gastroc str w towel roll  20''x5  x5 x5 x5 x5       gastroc stretch off TM step  Off step today 20''x5  20"x5 6" step x5 yellow stool Floor x5 x5 Hold      Bridge   10"x10 x15 x20 x25 x25 x25 x25 L U/L 10"x15   Clamshell L   10"x10 x15 x20 x25 x25 x25 x25 x30 (yel- low nv)   Stand L hip adductor str    20"x5 x5 x5 x5 Hold today     Stand hip march, ABD, ext    2x10 3x10 3x10 3x10  3x10 3x10 1# 3x10   Nu Step    10' 10' 10' 10' 10' 10' L8 10'   Stand hip flexor str    20"x5 x5 x5 x5 Hold today     TKE w t band L    nv 3x10 blk 3x10 3x10 3x10 3x10 nv   LAQ L    nv 3# 3x10 3x10 3x10 3x10 3# 3x10 D/c                                                                                                           Modalities             Ionto w dex L ant tib tendon  done

## 2019-10-07 NOTE — LETTER
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    2  Tear of tibialis anterior tendon S86 219A    3  Left ankle pain, unspecified chronicity M25 572        Dear Dr Ermias Shepherd: Thank you for your recent referral of Rocio Vaughn  Please review the attached evaluation summary from Ehsan's recent visit  Please verify that you agree with the plan of care by signing the attached order  If you have any questions or concerns, please do not hesitate to call  I sincerely appreciate the opportunity to share in the care of one of your patients and hope to have another opportunity to work with you in the near future  Sincerely,    Lavon Rios, PT      Referring Provider:      I certify that I have read the below Plan of Care and certify the need for these services furnished under this plan of treatment while under my care  Haydee Singh PA-C  [address]  [address] [address]  VIA Facsimile: [phone]          PT RE-EVALUATION    Today's date: 10/7/2019  Patient name: Rocio Vaughn  : 1940  MRN: [id number]  Referring provider: Pau Chen DPM  Dx:   Encounter Diagnosis     ICD-10-CM    1  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    2  Tear of tibialis anterior tendon S86 219A    3  Left ankle pain, unspecified chronicity M25 572        Start Time: 1021  Stop Time: 1139  Total time in clinic (min): 78 minutes    Subjective: current pain 2/10 L mid and fore foot medial aspect, "yesterday it was 5/10, I don't know why "  RE: functional mobility "I don't think it's helped  One day I get around better and the next I don't  Yesterday the ankle was swollen more "  Podiatry f/u 10/9/19  Objective: See treatment diary below    Pain:   L hip 0/10  L ankle/foot 0-5/10 medial aspect mid and forefoot  Palpation: L 1st metatarsal ray TTP, reproducing medial foot pain  L ankle FOTO functional score 42, projected score 56  Score at eval 35  Higher score = higher function  L hip FOTO functional score 46, projected score 52  Score at eval 42  Higher score = higher function  Gait: SPC R hand, 3 point reciprocal pattern, mildly antalgic LLE with decreased L heel strike and toe off     TUG: SPC R hand 22 10 sec  (L hip eval 9/16: 18 88 sec with wheeled walker)  L hip A/PROM: flexion 109 deg, ext -5 deg (-10 deg at eval), ABD 22 deg, ER 49 deg (35 deg at eval)    L ankle A/PROM: DF -3 deg (-12 deg at eval), PF 43 deg (29 deg at eval), ev 15 deg, inv 40 deg  MMT: L ankle DF, ev, inv 5/5  Gastroc in FWB unable to raise heel; MMT gastroc 2+/5  In sidelying: L glute med, max 4/5  L quads 5/5  LLE dynamic strength and balance 91% of RLE via single leg step test [place] [facility] [place]  Assessment: Tolerated treatment well  Patient would benefit from continued PT     Sky Valdez has been compliant with attending PT and home exercise program since initial eval   Juanita Lenz  has made improvements in objective data since initial eval but is still limited compared to prior level of function  Juanita Lenz continues with above listed impairments and would benefit from additional skilled PT to address these deficits to return to prior level of function  Juanita Lenz has attended 10 visits, missed 0 visits  Slow progress  Pain along L 1st metatarsal shaft limiting closed chain exercise tolerance and progression  Podiatry f/u 10/9/19        Goals - L ankle  Impairment Goals  - Pt I with initial HEP in 1-2 visits - met  - Improve ROM equal to contralateral side in 4-6 weeks - not met  - Increase strength to 5/5 in all affected areas in 4-6 week - not met    Functional Goals  - Increase Functional Status Measure to: 56 in 4-6  weeks  - not met  - Patient will be independent with comprehensive HEP in 4-6 weeks - not met  - Ambulation is improved to prior level of function, with SPC in 4-6 weeks - not met  - TUG with SPC, < 15 sec  - not met    Goals - L hip  Impairment Goals  - Pt I with initial HEP in 1-2 visits - met  - Improve L hip ROM to [facility] in 4-6 weeks - not met  - Increase strength to 5/5 in all affected areas in 4-6 week - not met    Functional Goals  - Increase Functional Status Measure to: 52 in 4-6  weeks  - not met  - Patient will be independent with comprehensive HEP in 4-6 weeks - met  - Ambulation steady with SPC in 4-6 weeks - met  - LLE dynamic strength and balance at least 90% of RLE via single leg step test, in 4-6 wks  - met with SPC  - TUG with SPC < 15 sec in 4-6 wks - not met    NEW GOALS:    New Goals - L ankle  Impairment Goals  - Improve DF ROM to at least +5 deg in 4-6 weeks  - Increase L gastroc strength in FWB to at least 3/5 in 4-6 week    Functional Goals  - Increase Functional Status Measure to: 56 in 4-6  weeks  - Patient will be independent with comprehensive HEP in 4-6 weeks  - Ambulation tolerance is improved to prior level of function, with SPC in 4-6 weeks  - TUG with SPC, < 15 sec  New Goals - L hip  Impairment Goals  - Improve L hip ext ROM to 0 deg  in 4-6 weeks  - Increase strength to 5/5 in all affected areas in 4-6 week    Functional Goals  - Increase Functional Status Measure to: 52 in 4-6  weeks  - LLE dynamic strength and balance at least 90% of RLE via single leg step test, in 4-6 wks  , with no AD   - TUG with SPC < 15 sec in 4-6 wks        Plan: Continue per plan of care  Precautions: none    Daily Treatment Diary       Manuals 9/9/19 9/11 9/16 9/17 9/19 9/23 9/25 9/30 10/2 10/7   Man str L gastroc, soleus  KAT  20"x5 x5 x5 x5 x10 x10 HJ x10   Ankle mobs L post talar  HJ  Plus talar distract G4 ea   G4 G4 G4 HJ G4, plus calc ABD, mid-  foot pron G4 HJ G4   Man str L hip ext, ABD, ER   nv 20"x5 x5 x5 x5 x5 x5 x5   Exercise Diary              Elev AROM DF/PF, ev/inv, cw/ccw x20 ea x20 ea           Strap gastroc str pull R > L hand 20"x5 x5      20"x5 x5 x5   BAPS seated             T band DF  Yellow 2x10   nv 3x10 3x10 3x10 Red 2x10 nv   Wall gastroc str w towel roll  20''x5  x5 x5 x5 x5       gastroc stretch off TM step  Off step today 20''x5  20"x5 6" step x5 yellow stool Floor x5 x5 Hold      Bridge   10"x10 x15 x20 x25 x25 x25 x25 L U/L 10"x15   Clamshell L   10"x10 x15 x20 x25 x25 x25 x25 x30 (yel- low nv)   Stand L hip adductor str    20"x5 x5 x5 x5 Hold today     Stand hip march, ABD, ext    2x10 3x10 3x10 3x10  3x10 3x10 1# 3x10   Nu Step    10' 10' 10' 10' 10' 10' L8 10'   Stand hip flexor str    20"x5 x5 x5 x5 Hold today     TKE w t band L    nv 3x10 blk 3x10 3x10 3x10 3x10 nv   LAQ L    nv 3# 3x10 3x10 3x10 3x10 3# 3x10 D/c                                                                                                           Modalities             Ionto w dex L ant tib tendon  done

## 2019-10-09 ENCOUNTER — OFFICE VISIT (OUTPATIENT)
Dept: PHYSICAL THERAPY | Facility: CLINIC | Age: 79
End: 2019-10-09
Payer: MEDICARE

## 2019-10-09 DIAGNOSIS — S86.219A TEAR OF TIBIALIS ANTERIOR TENDON: ICD-10-CM

## 2019-10-09 DIAGNOSIS — Z96.642 HISTORY OF REVISION OF TOTAL REPLACEMENT OF LEFT HIP JOINT: Primary | ICD-10-CM

## 2019-10-09 DIAGNOSIS — M25.572 LEFT ANKLE PAIN, UNSPECIFIED CHRONICITY: ICD-10-CM

## 2019-10-09 DIAGNOSIS — M79.672 LEFT FOOT PAIN: ICD-10-CM

## 2019-10-09 PROCEDURE — 97112 NEUROMUSCULAR REEDUCATION: CPT

## 2019-10-09 PROCEDURE — 97110 THERAPEUTIC EXERCISES: CPT

## 2019-10-09 PROCEDURE — 97140 MANUAL THERAPY 1/> REGIONS: CPT

## 2019-10-09 NOTE — PROGRESS NOTES
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    2  Tear of tibialis anterior tendon S86 219A    3  Left ankle pain, unspecified chronicity M25 572    4  Left foot pain M79 672                   Subjective: "I just saw Dr Megan Bah  It's just so tight  He wants me to get an EMG and see lymphedema because of the swelling  He gave me another script  He wants me to wear my stiff shoes outside " L foot pain 2-3/10 at arrival    Pt ambulates into clinic with SPC, no HS, antalgic gait  Script from Dr Megan Bah prescribed for PT for tib ant tear for another four weeks  Objective: See treatment diary below      Assessment: Tolerated treatment well  Patient would benefit from continued PT For improved strength, flexibility and overall function  Increased edema present in soft tissues surrounding entire L ankle joint and first metatarsal    Continued encouragement for HS and heel->toe step pattern with gait  Pt uses UEs for L hip flexion during transfers; suggested pt try lifting on her own to help strengthen hip flexors  Plan: Continue per plan of care        Precautions: none    Daily Treatment Diary       Manuals 10/9/19         10/7   Man str L gastroc, soleus 20''x10 HJ         x10   Ankle mobs L post talar, G4 plus calc, ABD, mid-foot pron HJ         G4   Man str L hip ext, ABD, ER 20''x5         x5   Exercise Diary                           Strap gastroc str pull R > L hand 20''x5         x5   T band DF Red   3x10          Red 2x10 nv   Bridge - L U/L 10'' x 20         L U/L 10"x15   Clamshell L Yellow  10''x15         x30 (yel- low nv)   Stand hip march, ABD, ext 1# 3x10         1# 3x10   Nu Step 10' L8         10'                TKE w t band L blk 3x10         nv Modalities             Ionto w dex L ant tib tendon  done

## 2019-10-14 ENCOUNTER — OFFICE VISIT (OUTPATIENT)
Dept: PHYSICAL THERAPY | Facility: CLINIC | Age: 79
End: 2019-10-14
Payer: MEDICARE

## 2019-10-14 DIAGNOSIS — S86.219A TEAR OF TIBIALIS ANTERIOR TENDON: ICD-10-CM

## 2019-10-14 DIAGNOSIS — M25.572 LEFT ANKLE PAIN, UNSPECIFIED CHRONICITY: ICD-10-CM

## 2019-10-14 DIAGNOSIS — Z96.642 HISTORY OF REVISION OF TOTAL REPLACEMENT OF LEFT HIP JOINT: Primary | ICD-10-CM

## 2019-10-14 PROCEDURE — 97112 NEUROMUSCULAR REEDUCATION: CPT

## 2019-10-14 PROCEDURE — 97140 MANUAL THERAPY 1/> REGIONS: CPT

## 2019-10-14 PROCEDURE — 97110 THERAPEUTIC EXERCISES: CPT

## 2019-10-14 NOTE — PROGRESS NOTES
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    2  Tear of tibialis anterior tendon S86 219A    3  Left ankle pain, unspecified chronicity M25 572                   Subjective: "The hip is fine  The ankle is a CHIDI "  L medial foot pain 3/10, occasional lateral foot pain, up to 5/10 over the weekend  "I did some things this weekend, I cooked, I walked outside a few times and of course the foot swells up "  Lymphedema consult tomorrow  Objective: See treatment diary below      Assessment: Tolerated treatment fair  Patient would benefit from continued PT L foot pain limiting closed chain exercise  Continued lack of DF ROM L ankle  Plan: Continue per plan of care        Precautions: none    Daily Treatment Diary       Manuals 10/9/19 10/14        10/7   Man str L gastroc, soleus 20''x10 HJ x10        x10   Ankle mobs L post talar, G4 plus calc, ABD, mid-foot pron HJ G3        G4   Man str L hip ext, ABD, ER 20''x5 x5        x5   Exercise Diary                           Strap gastroc str pull R > L hand 20''x5 x5        x5   T band DF Red   3x10   3x10       Red 2x10 nv   Bridge - L U/L 10'' x 20 x20        L U/L 10"x15   Clamshell L Yellow  10''x15 x15        x30 (yel- low nv)   Stand hip march, ABD, ext 1# 3x10 3x10        1# 3x10   Nu Step 10' L8 10'        10'                TKE w t band L blk 3x10 3x10        nv                                                                                                                        Modalities             Ionto w dex L ant tib tendon

## 2019-10-16 ENCOUNTER — OFFICE VISIT (OUTPATIENT)
Dept: PHYSICAL THERAPY | Facility: CLINIC | Age: 79
End: 2019-10-16
Payer: MEDICARE

## 2019-10-16 DIAGNOSIS — M79.672 LEFT FOOT PAIN: ICD-10-CM

## 2019-10-16 DIAGNOSIS — S86.219A TEAR OF TIBIALIS ANTERIOR TENDON: ICD-10-CM

## 2019-10-16 DIAGNOSIS — Z96.642 HISTORY OF REVISION OF TOTAL REPLACEMENT OF LEFT HIP JOINT: Primary | ICD-10-CM

## 2019-10-16 DIAGNOSIS — M25.572 LEFT ANKLE PAIN, UNSPECIFIED CHRONICITY: ICD-10-CM

## 2019-10-16 PROCEDURE — 97110 THERAPEUTIC EXERCISES: CPT

## 2019-10-16 PROCEDURE — 97112 NEUROMUSCULAR REEDUCATION: CPT

## 2019-10-16 NOTE — PROGRESS NOTES
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    2  Tear of tibialis anterior tendon S86 219A    3  Left ankle pain, unspecified chronicity M25 572    4  Left foot pain M79 672                   Subjective: "Oh it's going  I have new stockings from the lady who is working on the swelling  They're not too bad " Pain level 2-3/10 "burning along my big toe and sometimes I get a sharp shoot of pain on the outside of my foot  It moves "       Objective: See treatment diary below      Assessment: Tolerated treatment well  Patient would benefit from continued PT For improved strength, flexibility and overall function  Able to increase reps with exercises as per flow sheet; pt challenged appropriately  Resume hip stretching nv  Plan: Continue per plan of care        Precautions: none    Daily Treatment Diary       Manuals 10/9/19 10/14 10/16       107   Man str L gastroc, soleus 20''x10 HJ x10 x10 HJ       x10   Ankle mobs L post talar, G4 plus calc, ABD, mid-foot pron HJ G3 G3 HJ       G4   Man str L hip ext, ABD, ER 20''x5 x5 nv       x5   Exercise Diary                           Strap gastroc str pull R > L hand 20''x5 x5 x5       x5   T band DF Red   3x10   3x10 3x12      Red 2x10 nv   Bridge - L U/L 10'' x 20 x20 x20       L U/L 10"x15   Clamshell L Yellow  10''x15 x15 x20       x30 (yel- low nv)   Stand hip march, ABD, ext 1# 3x10 3x10 3x12       1# 3x10   Nu Step 10' L8 10' 10'        10'                TKE w t band L blk 3x10 3x10 3x12       nv                                                                                                                        Modalities             Ionto w dex L ant tib tendon

## 2019-10-21 ENCOUNTER — OFFICE VISIT (OUTPATIENT)
Dept: PHYSICAL THERAPY | Facility: CLINIC | Age: 79
End: 2019-10-21
Payer: MEDICARE

## 2019-10-21 DIAGNOSIS — S86.219A TEAR OF TIBIALIS ANTERIOR TENDON: ICD-10-CM

## 2019-10-21 DIAGNOSIS — M25.572 LEFT ANKLE PAIN, UNSPECIFIED CHRONICITY: ICD-10-CM

## 2019-10-21 DIAGNOSIS — M79.672 LEFT FOOT PAIN: ICD-10-CM

## 2019-10-21 DIAGNOSIS — Z96.642 HISTORY OF REVISION OF TOTAL REPLACEMENT OF LEFT HIP JOINT: Primary | ICD-10-CM

## 2019-10-21 PROCEDURE — 97112 NEUROMUSCULAR REEDUCATION: CPT

## 2019-10-21 PROCEDURE — 97110 THERAPEUTIC EXERCISES: CPT

## 2019-10-21 NOTE — PROGRESS NOTES
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    2  Tear of tibialis anterior tendon S86 219A    3  Left ankle pain, unspecified chronicity M25 572    4  Left foot pain M79 672                   Subjective: "It's not too bad today  I think the swelling has gone down some  I really don't know how much more movement I'm going to get out of this foot " Pt reports L foot pain 2/10 at arrival        Objective: See treatment diary below      Assessment: Tolerated treatment well  Patient would benefit from continued PT For improved strength, flexibility and overall function  Edema in soft tissues surrounding M/L malleoli and across ant tib tendon area does appear to be decreasing; pt donning compression stockings and continues to wear brace in supportive sneakers  Still lacking HS with gait  Plan: Continue per plan of care        Precautions: none    Daily Treatment Diary       Manuals 10/9/19 10/14 10/16       10/7   Man str L gastroc, soleus 20''x10 HJ x10 x10 HJ       x10   Ankle mobs L post talar, G4 plus calc, ABD, mid-foot pron HJ G3 G3 HJ       G4   Man str L hip ext, ABD, ER 20''x5 x5 nv       x5   Exercise Diary                           Strap gastroc str pull R > L hand 20''x5 x5 x5       x5   T band DF Red   3x10   3x10 3x12      Red 2x10 nv   Bridge - L U/L 10'' x 20 x20 x20       L U/L 10"x15   Clamshell L Yellow  10''x15 x15 x20       x30 (yel- low nv)   Stand hip march, ABD, ext 1# 3x10 3x10 3x12       1# 3x10   Nu Step 10' L8 10' 10'        10'                TKE w t band L blk 3x10 3x10 3x12       nv                                                                                                                        Modalities             Ionto w dex L ant tib tendon

## 2019-10-23 ENCOUNTER — OFFICE VISIT (OUTPATIENT)
Dept: PHYSICAL THERAPY | Facility: CLINIC | Age: 79
End: 2019-10-23
Payer: MEDICARE

## 2019-10-23 DIAGNOSIS — M79.672 LEFT FOOT PAIN: ICD-10-CM

## 2019-10-23 DIAGNOSIS — S86.219A TEAR OF TIBIALIS ANTERIOR TENDON: ICD-10-CM

## 2019-10-23 DIAGNOSIS — M25.572 LEFT ANKLE PAIN, UNSPECIFIED CHRONICITY: ICD-10-CM

## 2019-10-23 DIAGNOSIS — Z96.642 HISTORY OF REVISION OF TOTAL REPLACEMENT OF LEFT HIP JOINT: Primary | ICD-10-CM

## 2019-10-23 PROCEDURE — 97112 NEUROMUSCULAR REEDUCATION: CPT

## 2019-10-23 PROCEDURE — 97110 THERAPEUTIC EXERCISES: CPT

## 2019-10-23 NOTE — PROGRESS NOTES
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    2  Tear of tibialis anterior tendon S86 219A    3  Left ankle pain, unspecified chronicity M25 572    4  Left foot pain M79 672                   Subjective: "Yesterday I was on it pretty much, the top of the foot is sore  I renetta it stepping down off a high step " Pt c/o's L foot pain 2/10  Pt is seeing Dr Romie Rice next week  Objective: See treatment diary below      Assessment: Tolerated treatment well  Patient would benefit from continued PT For improved strength, flexibility and overall function  Still with limited ankle motion  Pt being treated for ankle lymphedema at this time; making some noticeable progress with decreasing swelling in L ankle  Plan: Continue per plan of care        Precautions: none    Daily Treatment Diary       Manuals 10/9/19 10/14 10/16 10/21 10/23     10   Man str L gastroc, soleus 20''x10 HJ x10 x10 HJ x10  x10 HJ     x10   Ankle mobs L post talar, G4 plus calc, ABD, mid-foot pron HJ G3 G3 HJ np G3 HJ     G4   Man str L hip ext, ABD, ER 20''x5 x5 nv x5 x5     x5   Exercise Diary                           Strap gastroc str pull R > L hand 20''x5 x5 x5 x5 x5     x5   T band DF Red   3x10   3x10 3x12 3x12 3x12    Red 2x10 nv   Bridge - L U/L 10'' x 20 x20 x20 x20 x25     L U/L 10"x15   Clamshell L Yellow  10''x15 x15 x20 x20 x20     x30 (yel- low nv)   Stand hip march, ABD, ext 1# 3x10 3x10 3x12 3x12 3x12     1# 3x10   Nu Step 10' L8 10' 10'  10' 10'      10'                TKE w t band L blk 3x10 3x10 3x12 3x12 3x12     nv                                                                                                                        Modalities             Ionto w dex L ant tib tendon

## 2019-10-28 ENCOUNTER — OFFICE VISIT (OUTPATIENT)
Dept: PHYSICAL THERAPY | Facility: CLINIC | Age: 79
End: 2019-10-28
Payer: MEDICARE

## 2019-10-28 DIAGNOSIS — M25.572 LEFT ANKLE PAIN, UNSPECIFIED CHRONICITY: ICD-10-CM

## 2019-10-28 DIAGNOSIS — M79.672 LEFT FOOT PAIN: ICD-10-CM

## 2019-10-28 DIAGNOSIS — S86.219A TEAR OF TIBIALIS ANTERIOR TENDON: ICD-10-CM

## 2019-10-28 DIAGNOSIS — Z96.642 HISTORY OF REVISION OF TOTAL REPLACEMENT OF LEFT HIP JOINT: Primary | ICD-10-CM

## 2019-10-28 PROCEDURE — 97110 THERAPEUTIC EXERCISES: CPT

## 2019-10-28 PROCEDURE — 97140 MANUAL THERAPY 1/> REGIONS: CPT

## 2019-10-28 PROCEDURE — 97112 NEUROMUSCULAR REEDUCATION: CPT

## 2019-10-28 NOTE — PROGRESS NOTES
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    2  Tear of tibialis anterior tendon S86 219A    3  Left ankle pain, unspecified chronicity M25 572    4  Left foot pain M79 672                   Subjective: "For the first time I feel like I'm making progress  I only have pain in the (left) big toe "      Objective: See treatment diary below      Assessment: Tolerated treatment well  Patient would benefit from continued PT  Pain, L ankle swelling improved  Plan: Continue per plan of care        Precautions: none    Daily Treatment Diary       Manuals 10/9/19 10/14 10/16 10/21 10/23 10/28    10/7   Man str L gastroc, soleus 20''x10 HJ x10 x10 HJ x10  x10 HJ x10    x10   Ankle mobs L post talar, G4 plus calc, ABD, mid-foot pron HJ G3 G3 HJ np G3 HJ G3    G4   Man str L hip ext, ABD, ER 20''x5 x5 nv x5 x5 x5    x5   Exercise Diary                           Strap gastroc str pull R > L hand 20''x5 x5 x5 x5 x5 x5    x5   T band DF Red   3x10   3x10 3x12 3x12 3x12 Green 3x10   Red 2x10 nv   Bridge - L U/L 10'' x 20 x20 x20 x20 x25 x25    L U/L 10"x15   Clamshell L Yellow  10''x15 x15 x20 x20 x20 x20    x30 (yel- low nv)   Stand hip march, ABD, ext 1# 3x10 3x10 3x12 3x12 3x12 2# 3x10    1# 3x10   Nu Step 10' L8 10' 10'  10' 10'  10'    10'                TKE w t band L blk 3x10 3x10 3x12 3x12 3x12 Silver 3x10    nv                                                                                                                        Modalities             Ionto w dex L ant tib tendon
yes

## 2019-10-30 ENCOUNTER — OFFICE VISIT (OUTPATIENT)
Dept: PHYSICAL THERAPY | Facility: CLINIC | Age: 79
End: 2019-10-30
Payer: MEDICARE

## 2019-10-30 DIAGNOSIS — M79.672 LEFT FOOT PAIN: ICD-10-CM

## 2019-10-30 DIAGNOSIS — S86.219A TEAR OF TIBIALIS ANTERIOR TENDON: ICD-10-CM

## 2019-10-30 DIAGNOSIS — M25.572 LEFT ANKLE PAIN, UNSPECIFIED CHRONICITY: ICD-10-CM

## 2019-10-30 DIAGNOSIS — Z96.642 HISTORY OF REVISION OF TOTAL REPLACEMENT OF LEFT HIP JOINT: Primary | ICD-10-CM

## 2019-10-30 PROCEDURE — 97110 THERAPEUTIC EXERCISES: CPT

## 2019-10-30 PROCEDURE — 97112 NEUROMUSCULAR REEDUCATION: CPT

## 2019-10-30 NOTE — PROGRESS NOTES
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    2  Tear of tibialis anterior tendon S86 219A    3  Left ankle pain, unspecified chronicity M25 572    4  Left foot pain M79 672                   Subjective: "I have no pain right now  The foot is getting better  I even went without the brace yesterday "      Objective: See treatment diary below      Assessment: Tolerated treatment well  Patient would benefit from continued PT    L foot pain, swelling improved  Lymphedema therapy later today  Plan: Continue per plan of care        Precautions: none    Daily Treatment Diary       Manuals 10/9/19 10/14 10/16 10/21 10/23 10/28 10/30      Man str L gastroc, soleus 20''x10 HJ x10 x10 HJ x10  x10 HJ x10 x10      Ankle mobs L post talar, G4 plus calc, ABD, mid-foot pron HJ G3 G3 HJ np G3 HJ G3 G3      Man str L hip ext, ABD, ER 20''x5 x5 nv x5 x5 x5 x5      Exercise Diary              Strap gastroc str pull R > L hand 20''x5 x5 x5 x5 x5 x5 x5      T band DF Red   3x10   3x10 3x12 3x12 3x12 Green 3x10 3x10      Bridge - L U/L 10'' x 20 x20 x20 x20 x25 x25 x20      Clamshell L Yellow  10''x15 x15 x20 x20 x20 x20 x20      Stand hip march, ABD, ext 1# 3x10 3x10 3x12 3x12 3x12 2# 3x10 3x10      Nu Step 10' L8 10' 10'  10' 10'  10' 10'      TKE w t band L blk 3x10 3x10 3x12 3x12 3x12 Silver 3x10 3x10                                                                                                                           Modalities             Ionto w dex L ant tib tendon

## 2019-11-04 ENCOUNTER — OFFICE VISIT (OUTPATIENT)
Dept: PHYSICAL THERAPY | Facility: CLINIC | Age: 79
End: 2019-11-04
Payer: MEDICARE

## 2019-11-04 DIAGNOSIS — Z96.642 HISTORY OF REVISION OF TOTAL REPLACEMENT OF LEFT HIP JOINT: Primary | ICD-10-CM

## 2019-11-04 DIAGNOSIS — M25.572 LEFT ANKLE PAIN, UNSPECIFIED CHRONICITY: ICD-10-CM

## 2019-11-04 DIAGNOSIS — M79.672 LEFT FOOT PAIN: ICD-10-CM

## 2019-11-04 DIAGNOSIS — S86.219A TEAR OF TIBIALIS ANTERIOR TENDON: ICD-10-CM

## 2019-11-04 PROCEDURE — 97112 NEUROMUSCULAR REEDUCATION: CPT

## 2019-11-04 PROCEDURE — 97110 THERAPEUTIC EXERCISES: CPT

## 2019-11-04 PROCEDURE — 97140 MANUAL THERAPY 1/> REGIONS: CPT

## 2019-11-04 NOTE — PROGRESS NOTES
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    2  Tear of tibialis anterior tendon S86 219A    3  Left ankle pain, unspecified chronicity M25 572    4  Left foot pain M79 672                   Subjective: "I don't know, I'm frustrated  I have pain above my arch and near by big toe, like a 3/10 today  I was kneeling trying to get my gas fireplace to work over the weekend, and I had to get back up and pushed off my foot, that was tough  I still have groin pain too  I just don't understand why it's not getting better " Pt admits she is only doing HEP "once in a while " Pt has a f/u with Dr Brian Graff tomorrow  Still seeing lymphedema therapy for swelling of L foot  Objective: See treatment diary below      Assessment: Tolerated treatment well  Patient would benefit from continued PT For improved strength, flexibility and overall function  Focusing on trying to improve strength and ROM in L foot and L hip but pt may be starting to plateau at this point  Urged pt to perform HEP regularly to maintain strength and motion gains and help prevent regression  Plan: Continue per plan of care        Precautions: none    Daily Treatment Diary       Manuals 10/9/19 10/14 10/16 10/21 10/23 10/28 10/30 11/4     Man str L gastroc, soleus 20''x10 HJ x10 x10 HJ x10  x10 HJ x10 x10 x10 HJ     Ankle mobs L post talar, G4 plus calc, ABD, mid-foot pron HJ G3 G3 HJ np G3 HJ G3 G3 G3 HJ     Man str L hip ext, ABD, ER 20''x5 x5 nv x5 x5 x5 x5 x5     Exercise Diary              Strap gastroc str pull R > L hand 20''x5 x5 x5 x5 x5 x5 x5 x5     T band DF Red   3x10   3x10 3x12 3x12 3x12 Green 3x10 3x10 3x10     Bridge - L U/L 10'' x 20 x20 x20 x20 x25 x25 x20 x20     Clamshell L Yellow  10''x15 x15 x20 x20 x20 x20 x20 x30 Stand hip march, ABD, ext 1# 3x10 3x10 3x12 3x12 3x12 2# 3x10 3x10 3x10     Nu Step 10' L8 10' 10'  10' 10'  10' 10' 10'     TKE w t band L blk 3x10 3x10 3x12 3x12 3x12 Silver 3x10 3x10 3x10                                                                                                                          Modalities             Ionto w dex L ant tib tendon

## 2019-11-06 ENCOUNTER — APPOINTMENT (OUTPATIENT)
Dept: PHYSICAL THERAPY | Facility: CLINIC | Age: 79
End: 2019-11-06
Payer: MEDICARE

## 2019-11-13 NOTE — PROGRESS NOTES
Pt reported to clinic today to resume fitness program, continue HEP  Pt states that per Dr Real Ash she is to be D/C from PT  Is undergoing L foot MAFO modifications to address L hallux painful extension  D/C to HEP, fitness program   Oneal Golden attended 18 visits, missed 0

## 2020-02-11 ENCOUNTER — EVALUATION (OUTPATIENT)
Dept: PHYSICAL THERAPY | Facility: CLINIC | Age: 80
End: 2020-02-11
Payer: MEDICARE

## 2020-02-11 ENCOUNTER — TRANSCRIBE ORDERS (OUTPATIENT)
Dept: PHYSICAL THERAPY | Facility: CLINIC | Age: 80
End: 2020-02-11

## 2020-02-11 DIAGNOSIS — S86.219A ANTERIOR TIBIAL TENDON TEAR, TRAUMATIC: Primary | ICD-10-CM

## 2020-02-11 DIAGNOSIS — M25.572 LEFT ANKLE PAIN, UNSPECIFIED CHRONICITY: ICD-10-CM

## 2020-02-11 DIAGNOSIS — M76.812 ANTERIOR TIBIALIS TENDINITIS OF LEFT LOWER EXTREMITY: ICD-10-CM

## 2020-02-11 PROCEDURE — 97116 GAIT TRAINING THERAPY: CPT

## 2020-02-11 PROCEDURE — 97161 PT EVAL LOW COMPLEX 20 MIN: CPT

## 2020-02-11 NOTE — LETTER
2020    Fletcher Morales DPM  C/Shar Cresponilla    Patient: Angel Campuzano   YOB: 1940   Date of Visit: 2020     Encounter Diagnosis     ICD-10-CM    1  Anterior tibial tendon tear, traumatic S86 219A    2  Left ankle pain, unspecified chronicity M25 572    3  Anterior tibialis tendinitis of left lower extremity W24 [id number]        Dear Dr Loya Pall: Thank you for your recent referral of Angel Campuzano  Please review the attached evaluation summary from Ehsan's recent visit  Please verify that you agree with the plan of care by signing the attached order  If you have any questions or concerns, please do not hesitate to call  I sincerely appreciate the opportunity to share in the care of one of your patients and hope to have another opportunity to work with you in the near future  Sincerely,    Tiffanie Ojeda, PT      Referring Provider:      I certify that I have read the below Plan of Care and certify the need for these services furnished under this plan of treatment while under my care  Fletcher Morales DPM  [address]  [address] [facility]: [phone]          PT Evaluation     Today's date: 2020  Patient name: Angel Campuzano  : 1940  MRN: [id number]  Referring provider: Ileana Heck DPM  Dx:   Encounter Diagnosis     ICD-10-CM    1  Anterior tibial tendon tear, traumatic S86 219A    2  Left ankle pain, unspecified chronicity M25 572    3  Anterior tibialis tendinitis of left lower extremity M76 812                   Assessment  Assessment details: Angel Campuzano is a 67 y o  female presents with L ankle pain, edema, anterior tibialis tear/tendinitis  Possible talar stress fx on MRI, with significant edema, ROM, strength impairments   Angel Campuzano has the above listed impairments and will benefit from skilled PT to improve deficits to return to prior level of function  Tahmina Card was educated on eval findings and plan for management, safe ice, elevation  Stair mobility reviewed with need to lead with RLE not LLE as with exacerbation of symptoms on steps 2/8/20  HEP initiated  Manohar Diamond would benefit from skilled services to improve ROM, strength, and function, and to decrease pain and edema  Impairments: abnormal gait, abnormal or restricted ROM, activity intolerance, impaired balance, impaired physical strength, lacks appropriate home exercise program, pain with function, safety issue, weight-bearing intolerance and poor posture   Understanding of Dx/Px/POC: good   Prognosis: fair    Goals  Impairment Goals  - Pt I with initial HEP in 1-2 visits  - Improve ROM equal to [facility] in 10-12 weeks  - Increase strength to 5/5 in all affected areas in 10-12 wks  - Fig 8 girth differential decreased at least 75 % in 10-12 wks    Functional Goals  - Increase Functional Status Measure to: 40 in 10-12 weeks  - Patient will be independent with comprehensive HEP in 10-12 weeks  - Ambulation is improved to prior level of function in 10-12 weeks, with SPC steady 3 point pattern for household distances in MAFO/brace and street shoe  - Stair climbing is improved to prior level of function in 10-12 weeks, step to pattern with cane/rail  - TUG with SPC, < 15 sec, in 10-12 wks      Plan  Plan details: Pt's friend educated as well    Patient would benefit from: skilled physical therapy  Planned modality interventions: cryotherapy  Planned therapy interventions: muscle pump exercises, neuromuscular re-education, manual therapy, joint mobilization, patient education, strengthening, stretching, therapeutic activities, therapeutic exercise, flexibility, balance, activity modification, home exercise program and gait training  Frequency: 2x week  Duration in visits: 8  Duration in weeks: 4  Treatment plan discussed with: patient        Subjective Evaluation    History of Present Illness  Mechanism of injury: Pt to PT with dx L ankle pain, anterior tibialis tendinitis/tear  Per MRI 12/28/19 Impression:  1  There is rather extensive abnormal marrow signal involving the talus and  tarsal navicular  This could be reactive  The possibility of stress reaction or  stress fracture involving the talus is not excluded  2  Intact tibialis anterior tendon with fusiform thickening and intrasubstance  T2 signal suggests tendinosis and/or intrasubstance partial tear  3  There is mild fusiform thickening of the distal Achilles tendon with linear  intrasubstance T2 signal compatible with tendinosis  4  Considerable soft tissue swelling and subcutaneous edema about the ankle most  notable laterally  Pt reports L ankle pain since AUG 2019  Was treated in PT here  Pt had insidious onset pain, with suspected anterior tibialis tendon tear at that time  Also PMH posterior tibialis tendinitis  [address] L VITO  April 2018, "he (ortho) tried to lengthen my leg and I don't think there was consideration given to the tendons "  Had received L hip PT here as well  Chief complaint currently "pain in the whole left ankle, foot  I just can't walk on it  Sometimes the ball of the foot, sometimes the arch, sometimes the ankle "  Functional limitations: ambulation with wheeled walker in ortho boot  Was hospitalized late [address] [facility] [address] 2019 due to LLE pain, MRI taken, see above  Received inpatient and home PT prior to today's eval   Notes during home PT "there were days I could really move good  Then I put it back in the boot and it freezes again, like I can't move it "  Notes 2/8/20 pt went to friend's house wearing sneaker with MAFO, took step into house, "I think the step was too big that I took, I don't know what I did but it was definitely a setback, I'm worse than I was "   Reports ascending steps leading with LLE in step to pattern  Pt states she was told by referring DPM Dr Lilly Crump to wear MAFO as tolerated    Pt has had referral for brace for LLE with pt fitted on 20  Pt reports being D/C from care of DPM "except to have this brace made" with no DPM f/u scheduled  Pain  Current pain ratin  At best pain ratin  At worst pain ratin  Location: L foot, ankle generalized  Quality: burning, dull ache and sharp  Aggravating factors: standing and walking    Social Support  Steps to enter house: yes  2  Stairs in house: yes   1  Lives in: [facility]  Lives with: Friend  Employment status: not working (Retired)    Diagnostic Tests  MRI studies: abnormal (See HPI, MRI report in chart)  Treatments  Previous treatment: physical therapy, injection treatment, home therapy, immobilization and medication  Current treatment: medication  Discharged from (in last 30 days): home health care  Patient Goals  Patient goals for therapy: decreased pain, decreased edema, increased strength, increased motion, improved balance, independence with ADLs/IADLs and return to sport/leisure activities  Patient goal: Gardening        Objective     Gait: wheeled walker, LLE in ortho boot, 3 point step to pattern, decreased cristina  TUG: wheeled walker, boot, pt reporting increased pain  Pt ambulated 1 foot, at least 30 sec with slow transfer sit to stand with BUE assist   Test stopped due to time constraints in ambulating 1 foot with pain  Observation: marked edema L foot, ankle  B pes planus  No erythema, ecchymosis L ankle/foot  Girth measurements: Fig 8 R 51 8 cm, L 58 3 cm    Ankle A/PROM:  R: DF 7 deg, PF 30 deg, ev 8 deg, inv 47 deg  L: DF -17 deg, PF 28 deg, ev 10 deg, inv 8 deg  MMT: not tested formally due to pain, L ankle 2-/5 grossly assessed all directions          Flowsheet Rows      Most Recent Value   PT/OT G-Codes   Current Score  23   Projected Score  40                   Precautions: PMH depression, CA, PE    Daily Treatment Diary       Manuals             Ankle mob dist, talar AP/PA             Man str DF,PF,Ev,Inv Elev retro massage                          Exercise Diary              Elev AROM PF/DV, cw/ccw, inv/ev HEP            Strap gastroc str HEP            BAPS seated                                                                                                                                                                                                                                                                    Modalities             ice nv

## 2020-02-11 NOTE — PROGRESS NOTES
PT Evaluation     Today's date: 2020  Patient name: Kimberly Perez  : 1940  MRN: [id number]  Referring provider: Long Dunn DPM  Dx:   Encounter Diagnosis     ICD-10-CM    1  Anterior tibial tendon tear, traumatic S86 219A    2  Left ankle pain, unspecified chronicity M25 572    3  Anterior tibialis tendinitis of left lower extremity M76 812                   Assessment  Assessment details: Kimberly Perez is a 67 y o  female presents with L ankle pain, edema, anterior tibialis tear/tendinitis  Possible talar stress fx on MRI, with significant edema, ROM, strength impairments   Kimberly Perez has the above listed impairments and will benefit from skilled PT to improve deficits to return to prior level of function  Kimberly Perez was educated on eval findings and plan for management, safe ice, elevation  Stair mobility reviewed with need to lead with RLE not LLE as with exacerbation of symptoms on steps 20  HEP initiated  Autumn Daniel would benefit from skilled services to improve ROM, strength, and function, and to decrease pain and edema      Impairments: abnormal gait, abnormal or restricted ROM, activity intolerance, impaired balance, impaired physical strength, lacks appropriate home exercise program, pain with function, safety issue, weight-bearing intolerance and poor posture   Understanding of Dx/Px/POC: good   Prognosis: fair    Goals  Impairment Goals  - Pt I with initial HEP in 1-2 visits  - Improve ROM equal to [facility] in 10-12 weeks  - Increase strength to 5/5 in all affected areas in 10-12 wks  - Fig 8 girth differential decreased at least 75 % in 10-12 wks    Functional Goals  - Increase Functional Status Measure to: 40 in 10-12 weeks  - Patient will be independent with comprehensive HEP in 10-12 weeks  - Ambulation is improved to prior level of function in 10-12 weeks, with SPC steady 3 point pattern for household distances in MAFO/brace and street shoe  - Stair climbing is improved to prior level of function in 10-12 weeks, step to pattern with cane/rail  - TUG with SPC, < 15 sec, in 10-12 wks      Plan  Plan details: Pt's friend educated as well  Patient would benefit from: skilled physical therapy  Planned modality interventions: cryotherapy  Planned therapy interventions: muscle pump exercises, neuromuscular re-education, manual therapy, joint mobilization, patient education, strengthening, stretching, therapeutic activities, therapeutic exercise, flexibility, balance, activity modification, home exercise program and gait training  Frequency: 2x week  Duration in visits: 8  Duration in weeks: 4  Treatment plan discussed with: patient        Subjective Evaluation    History of Present Illness  Mechanism of injury: Pt to PT with dx L ankle pain, anterior tibialis tendinitis/tear  Per MRI 12/28/19 Impression:  1  There is rather extensive abnormal marrow signal involving the talus and  tarsal navicular  This could be reactive  The possibility of stress reaction or  stress fracture involving the talus is not excluded  2  Intact tibialis anterior tendon with fusiform thickening and intrasubstance  T2 signal suggests tendinosis and/or intrasubstance partial tear  3  There is mild fusiform thickening of the distal Achilles tendon with linear  intrasubstance T2 signal compatible with tendinosis  4  Considerable soft tissue swelling and subcutaneous edema about the ankle most  notable laterally  Pt reports L ankle pain since AUG 2019  Was treated in PT here  Pt had insidious onset pain, with suspected anterior tibialis tendon tear at that time  Also PMH posterior tibialis tendinitis  [address]  April 2018, "he (ortho) tried to lengthen my leg and I don't think there was consideration given to the tendons "  Had received L hip PT here as well  Chief complaint currently "pain in the whole left ankle, foot  I just can't walk on it    Sometimes the ball of the foot, sometimes the arch, sometimes the ankle "  Functional limitations: ambulation with wheeled walker in ortho boot  Was hospitalized late [facility] [address]  due to LLE pain, MRI taken, see above  Received inpatient and home PT prior to today's eval   Notes during home PT "there were days I could really move good  Then I put it back in the boot and it freezes again, like I can't move it "  Notes 20 pt went to friend's house wearing sneaker with MAFO, took step into house, "I think the step was too big that I took, I don't know what I did but it was definitely a setback, I'm worse than I was "   Reports ascending steps leading with LLE in step to pattern  Pt states she was told by referring DPM Dr Eunice Farris to wear MAFO as tolerated  Pt has had referral for brace for LLE with pt fitted on 20  Pt reports being D/C from care of DPM "except to have this brace made" with no DPM f/u scheduled  Pain  Current pain ratin  At best pain ratin  At worst pain ratin  Location: L foot, ankle generalized  Quality: burning, dull ache and sharp  Aggravating factors: standing and walking    Social Support  Steps to enter house: yes  2  Stairs in house: yes   1  Lives in: [place] [facility]  Lives with: Friend  Employment status: not working (Retired)    Diagnostic Tests  MRI studies: abnormal (See HPI, MRI report in chart)  Treatments  Previous treatment: physical therapy, injection treatment, home therapy, immobilization and medication  Current treatment: medication  Discharged from (in last 30 days): home health care  Patient Goals  Patient goals for therapy: decreased pain, decreased edema, increased strength, increased motion, improved balance, independence with ADLs/IADLs and return to sport/leisure activities  Patient goal: Gardening        Objective     Gait: wheeled walker, LLE in ortho boot, 3 point step to pattern, decreased cristina  TUG: wheeled walker, boot, pt reporting increased pain    Pt ambulated 1 foot, at least 30 sec with slow transfer sit to stand with BUE assist   Test stopped due to time constraints in ambulating 1 foot with pain  Observation: marked edema L foot, ankle  B pes planus  No erythema, ecchymosis L ankle/foot  Girth measurements: Fig 8 R 51 8 cm, L 58 3 cm    Ankle A/PROM:  R: DF 7 deg, PF 30 deg, ev 8 deg, inv 47 deg  L: DF -17 deg, PF 28 deg, ev 10 deg, inv 8 deg  MMT: not tested formally due to pain, L ankle 2-/5 grossly assessed all directions          Flowsheet Rows      Most Recent Value   PT/OT G-Codes   Current Score  23   Projected Score  40                   Precautions: PMH depression, CA, PE    Daily Treatment Diary       Manuals 2/11/ 20            Ankle mob dist, talar AP/PA             Man str DF,PF,Ev,Inv             Elev retro massage                          Exercise Diary              Elev AROM PF/DV, cw/ccw, inv/ev HEP            Strap gastroc str HEP            BAPS seated                                                                                                                                                                                                                                                                    Modalities             ice nv

## 2020-02-13 ENCOUNTER — OFFICE VISIT (OUTPATIENT)
Dept: PHYSICAL THERAPY | Facility: CLINIC | Age: 80
End: 2020-02-13
Payer: MEDICARE

## 2020-02-13 DIAGNOSIS — M25.572 LEFT ANKLE PAIN, UNSPECIFIED CHRONICITY: ICD-10-CM

## 2020-02-13 DIAGNOSIS — M76.812 ANTERIOR TIBIALIS TENDINITIS OF LEFT LOWER EXTREMITY: ICD-10-CM

## 2020-02-13 DIAGNOSIS — S86.219A ANTERIOR TIBIAL TENDON TEAR, TRAUMATIC: Primary | ICD-10-CM

## 2020-02-13 PROCEDURE — 97140 MANUAL THERAPY 1/> REGIONS: CPT

## 2020-02-13 PROCEDURE — 97110 THERAPEUTIC EXERCISES: CPT

## 2020-02-13 NOTE — PROGRESS NOTES
Daily Note     Today's date: 2020  Patient name: Sky Valdez  : 1940  MRN: [id number]  Referring provider: Juan Jose Keller DPM  Dx:   Encounter Diagnosis     ICD-10-CM    1  Anterior tibial tendon tear, traumatic S86 219A    2  Left ankle pain, unspecified chronicity M25 572    3  Anterior tibialis tendinitis of left lower extremity M76 812                   Subjective: "It's not too bad, but when it's in the boot it hurts "  L ankle/foot pain 3-4/10  Objective: See treatment diary below      Assessment: Tolerated treatment fair  Patient would benefit from continued PT      Plan: Continue per plan of care              Precautions: PMH depression, CA, PE    Daily Treatment Diary       Manuals            Ankle mob dist, talar AP/PA  G3           Man str DF,PF,Ev,Inv  20"x10 ea           Elev retro massage  10'                        Exercise Diary              Elev AROM PF/DV, cw/ccw, inv/ev HEP x30 ea           Strap gastroc str HEP 20"x5           BAPS seated                                                                                                                                                                                                                                                                    Modalities             ice nv 10'

## 2020-02-17 ENCOUNTER — OFFICE VISIT (OUTPATIENT)
Dept: PHYSICAL THERAPY | Facility: CLINIC | Age: 80
End: 2020-02-17
Payer: MEDICARE

## 2020-02-17 DIAGNOSIS — M25.572 LEFT ANKLE PAIN, UNSPECIFIED CHRONICITY: ICD-10-CM

## 2020-02-17 DIAGNOSIS — M76.812 ANTERIOR TIBIALIS TENDINITIS OF LEFT LOWER EXTREMITY: ICD-10-CM

## 2020-02-17 DIAGNOSIS — S86.219A ANTERIOR TIBIAL TENDON TEAR, TRAUMATIC: Primary | ICD-10-CM

## 2020-02-17 PROCEDURE — 97140 MANUAL THERAPY 1/> REGIONS: CPT

## 2020-02-17 PROCEDURE — 97110 THERAPEUTIC EXERCISES: CPT

## 2020-02-17 NOTE — PROGRESS NOTES
Daily Note     Today's date: 2020  Patient name: Eron Calderon  : 1940  MRN: [id number]  Referring provider: Ayesha Pink DPM  Dx:   Encounter Diagnosis     ICD-10-CM    1  Anterior tibial tendon tear, traumatic S86 219A    2  Left ankle pain, unspecified chronicity M25 572    3  Anterior tibialis tendinitis of left lower extremity M76 812                   Subjective: "I don't know, it's bad  I pumped my boot up to see if it would help but it made it worse " Pt c/o's of L foot pain 10/10 at arrival, denies emergency services and agreeable to try PT despite high pain levels  States she is supposed to wean out of boot into sneakers with MAFO next Thursday, 2020  Objective: See treatment diary below      Assessment: Tolerated treatment well  Patient would benefit from continued PT For improved strength, flexibility and overall function  Decreased one pump pressure from boot for comfort  Needed max-A x 1 for STS from low mat table  Pt hesitant to WB through L LE; needed cueing for follow through to help normalize gait  Pain level decreased slightly following today's session  Plan: Continue per plan of care              Precautions: PMH depression, CA, PE    Daily Treatment Diary       Manuals            Ankle mob dist, talar AP/PA  G3 G3 HJ          Man str DF,PF,Ev,Inv  20"x10 ea x10 ea          Elev retro massage  10' 10'                       Exercise Diary              Elev AROM PF/DV, cw/ccw, inv/ev HEP x30 ea x30 ea          Strap gastroc str HEP 20"x5 x5          BAPS seated                                                                                                                                                                                                                                                                    Modalities             ice nv 10' 10'

## 2020-02-19 ENCOUNTER — OFFICE VISIT (OUTPATIENT)
Dept: PHYSICAL THERAPY | Facility: CLINIC | Age: 80
End: 2020-02-19
Payer: MEDICARE

## 2020-02-19 DIAGNOSIS — M76.812 ANTERIOR TIBIALIS TENDINITIS OF LEFT LOWER EXTREMITY: ICD-10-CM

## 2020-02-19 DIAGNOSIS — S86.219A ANTERIOR TIBIAL TENDON TEAR, TRAUMATIC: Primary | ICD-10-CM

## 2020-02-19 DIAGNOSIS — M25.572 LEFT ANKLE PAIN, UNSPECIFIED CHRONICITY: ICD-10-CM

## 2020-02-19 PROCEDURE — 97140 MANUAL THERAPY 1/> REGIONS: CPT

## 2020-02-19 PROCEDURE — 97110 THERAPEUTIC EXERCISES: CPT

## 2020-02-19 NOTE — PROGRESS NOTES
Daily Note     Today's date: 2020  Patient name: Alexandre Brumfield  : 1940  MRN: [id number]  Referring provider: Nighat Boyer DPM  Dx:   Encounter Diagnosis     ICD-10-CM    1  Anterior tibial tendon tear, traumatic S86 219A    2  Left ankle pain, unspecified chronicity M25 572    3  Anterior tibialis tendinitis of left lower extremity M76 812                   Subjective: "It's a little better than last time " Pt c/o's L foot pain 6/10 at arrival        Objective: See treatment diary below      Assessment: Tolerated treatment fair  Patient would benefit from continued PT  Corrected gait and posture with RW as pt tends to hunch forward with decreased step length  Pitting edema present in soft tissues surrounding L metatarsals with TTP laterally along 5th metatarsal head  Plan: Continue per plan of care              Precautions: PMH depression, CA, PE    Daily Treatment Diary       Manuals          Ankle mob dist, talar AP/PA  G3 G3 HJ G3 HJ         Man str DF,PF,Ev,Inv  20"x10 ea x10 ea x10 ea         Elev retro massage  10' 10' 10'                      Exercise Diary              Elev AROM PF/DV, cw/ccw, inv/ev HEP x30 ea x30 ea x30 ea         Strap gastroc str HEP 20"x5 x5 x5         BAPS seated                                                                                                                                                                                                                                                                    Modalities             ice nv 10' 10' 10'

## 2020-02-24 ENCOUNTER — OFFICE VISIT (OUTPATIENT)
Dept: PHYSICAL THERAPY | Facility: CLINIC | Age: 80
End: 2020-02-24
Payer: MEDICARE

## 2020-02-24 DIAGNOSIS — S86.219A ANTERIOR TIBIAL TENDON TEAR, TRAUMATIC: Primary | ICD-10-CM

## 2020-02-24 DIAGNOSIS — M76.812 ANTERIOR TIBIALIS TENDINITIS OF LEFT LOWER EXTREMITY: ICD-10-CM

## 2020-02-24 DIAGNOSIS — M25.572 LEFT ANKLE PAIN, UNSPECIFIED CHRONICITY: ICD-10-CM

## 2020-02-24 PROCEDURE — 97112 NEUROMUSCULAR REEDUCATION: CPT

## 2020-02-24 PROCEDURE — 97140 MANUAL THERAPY 1/> REGIONS: CPT

## 2020-02-24 PROCEDURE — 97110 THERAPEUTIC EXERCISES: CPT

## 2020-02-24 NOTE — PROGRESS NOTES
Daily Note     Today's date: 2020  Patient name: Kayla Rob  : 1940  MRN: [id number]  Referring provider: Norma Pizano DPM  Dx:   Encounter Diagnosis     ICD-10-CM    1  Anterior tibial tendon tear, traumatic S86 219A    2  Left ankle pain, unspecified chronicity M25 572    3  Anterior tibialis tendinitis of left lower extremity M76 812                   Subjective: L ankle/foot pain 3-4/10  Notes improved stair mobility into/out of house  Prefers sneaker with MAFO      Objective: See treatment diary below  To PT wearing L MAFO with sneaker, ambulating with wheeled walker in 3 point step-to pattern, improved cristina vs eval   Decreased L foot, ankle edema, with blood vessels visible dorsum L foot  Assessment: Tolerated treatment well  Patient would benefit from continued PT  Improving ROM, gait, pain, edema  Plan: Continue per plan of care              Precautions: PMH depression, CA, PE    Daily Treatment Diary       Manuals         Ankle mob dist, talar AP/PA  G3 G3 HJ G3 HJ G3        Man str DF,PF,Ev,Inv  20"x10 ea x10 ea x10 ea x10 ea        Elev retro massage  10' 10' 10' 10'                     Exercise Diary              Elev AROM PF/DV, cw/ccw, inv/ev HEP x30 ea x30 ea x30 ea x30 ea        Strap gastroc str HEP 20"x5 x5 x5 x5        BAPS seated f/b,s/s,cw/ccw     L2 x 20 ea                                                                                                                                                                                                                                                               Modalities             ice nv 10' 10' 10' 10'

## 2020-02-26 ENCOUNTER — OFFICE VISIT (OUTPATIENT)
Dept: PHYSICAL THERAPY | Facility: CLINIC | Age: 80
End: 2020-02-26
Payer: MEDICARE

## 2020-02-26 DIAGNOSIS — S86.219A ANTERIOR TIBIAL TENDON TEAR, TRAUMATIC: Primary | ICD-10-CM

## 2020-02-26 DIAGNOSIS — M25.572 LEFT ANKLE PAIN, UNSPECIFIED CHRONICITY: ICD-10-CM

## 2020-02-26 DIAGNOSIS — M76.812 ANTERIOR TIBIALIS TENDINITIS OF LEFT LOWER EXTREMITY: ICD-10-CM

## 2020-02-26 PROCEDURE — 97110 THERAPEUTIC EXERCISES: CPT

## 2020-02-26 PROCEDURE — 97112 NEUROMUSCULAR REEDUCATION: CPT

## 2020-02-26 PROCEDURE — 97140 MANUAL THERAPY 1/> REGIONS: CPT

## 2020-02-26 NOTE — PROGRESS NOTES
Daily Note     Today's date: 2020  Patient name: Jan Garcia  : 1940  MRN: [id number]  Referring provider: Crissy Finley DPM  Dx:   Encounter Diagnosis     ICD-10-CM    1  Anterior tibial tendon tear, traumatic S86 219A    2  Left ankle pain, unspecified chronicity M25 572    3  Anterior tibialis tendinitis of left lower extremity M76 812                   Subjective: pt notes insidious onset R foot pain along dorsomedial aspect, "it feels just like the other one did" RE: L anterior tibialis tear  "I didn't do anything, it just keeps getting worse and worse  That one hurts more than the left one now "  R foot pain "10/10 when I'm on it  The left one isn't bad, maybe 3/10 "    Objective: See treatment diary below  PER HJ, PT: R anterior tib tendon TTP reproducing chief complaint R foot pain  MMT R anterior tib strong and painful  Urged pt to f/u with DPM RE: R foot pain due to [address] L anterior tib tear of insidious onset  Encouraged pt to ice R foot, stretch R heel cord as well as L  Assessment: Tolerated treatment fair  Insidious onset dorsomedial aspect R foot, with PMH L anterior tib tendon tear  Pt with B pes cavus, tight heel cords  Urged pt to rest, ice, stretch B heel cords and f/u with DPM RE: R foot pain  Concern over potential tear R anterior tib tendon  Plan: Continue per plan of care        Precautions: PMH depression, CA, PE    Daily Treatment Diary     Manuals        Ankle mob dist, talar AP/PA  G3 G3 HJ G3 HJ G3 G3       Man str DF,PF,Ev,Inv  20"x10 ea x10 ea x10 ea x10 ea x10       Elev retro massage  10' 10' 10' 10' 10'                    Exercise Diary              Elev AROM PF/DV, cw/ccw, inv/ev HEP x30 ea x30 ea x30 ea x30 ea x30 ea       Strap gastroc str HEP 20"x5 x5 x5 x5 x5       BAPS seated f/b,s/s,cw/ccw     L2 x 20 ea x20 Modalities             ice nv 10' 10' 10' 10' 10'

## 2020-02-26 NOTE — PROGRESS NOTES
Pt entered PT ambulating with wheeled walker, L foot/ankle in MAFO, decreased cristina  Pt left PT with staff assistance, pt in wheel chair due to R foot pain  Pt/friend called upon pt's return home, with pt's son assisting pt with w/c into house  Pt to f/u with DPM re: R foot pain    Urged pt to hold on PT while in severe RLE pain until examined by DPM

## 2020-03-09 NOTE — PROGRESS NOTES
Pt was contacted by PT  RE: status of her appt today  Pt informed  that she has an RLE fracture, and that she is to be D/C from PT at this time  Pt to re-schedule PT in the future PRN  Pt attended 6 visits for L ankle, missed 2 due to insidious onset RLE pain  D/C to HEP

## 2020-07-13 ENCOUNTER — EVALUATION (OUTPATIENT)
Dept: PHYSICAL THERAPY | Facility: CLINIC | Age: 80
End: 2020-07-13
Payer: MEDICARE

## 2020-07-13 DIAGNOSIS — M67.02 TIGHT HEEL CORDS, ACQUIRED, BILATERAL: ICD-10-CM

## 2020-07-13 DIAGNOSIS — M76.811 ANTERIOR TIBIALIS TENDONITIS OF RIGHT LEG: ICD-10-CM

## 2020-07-13 DIAGNOSIS — S86.212D STRAIN OF MUSCLE(S) AND TENDON(S) OF ANTERIOR MUSCLE GROUP AT LOWER LEG LEVEL, LEFT LEG, SUBSEQUENT ENCOUNTER: Primary | ICD-10-CM

## 2020-07-13 DIAGNOSIS — M67.01 TIGHT HEEL CORDS, ACQUIRED, BILATERAL: ICD-10-CM

## 2020-07-13 PROCEDURE — 97110 THERAPEUTIC EXERCISES: CPT | Performed by: PHYSICAL THERAPIST

## 2020-07-13 PROCEDURE — 97161 PT EVAL LOW COMPLEX 20 MIN: CPT | Performed by: PHYSICAL THERAPIST

## 2020-07-13 NOTE — PROGRESS NOTES
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     M67 02    3  Anterior tibialis tendonitis of right leg M76 811                   Assessment  Assessment details: Jacob Bonilla is a 79 y o  female who presents to physical therapy with diagnosis of strain of muscles and tendons of anterior muscle group at lower leg level, left, subsequent encounter, tight heel cords, and anterior tibialis tendonitis of right leg  Brook Bunn presents with abnormal gait, limited endurance, poor posture, decrease L foot/ankle AROM and strength, and limitations in her functional abilities  The current limitations are affecting Ehsan's ability to function at prior level  She will benefit from skilled physical therapy to address the current impairments and functional limitations to enable her to function at her maximal level of function and improve her QOL   Thank you for the referral     Impairments: abnormal gait, abnormal or restricted ROM, activity intolerance, impaired balance, impaired physical strength, lacks appropriate home exercise program and poor posture     Symptom irritability: lowUnderstanding of Dx/Px/POC: good   Prognosis: fair    Goals  STG  Patient will be independent with basic HEP  Patient will improve ankle ROM 5 degrees in all planes  Patient will improve strength 1/2 grade in deficit planes      LTG  Patient will improve FOTO to 52  Patient will improve ambulation tolerance to 20 minutes  Patient will improve TUG time to 1:15      Plan  Patient would benefit from: skilled physical therapy  Planned therapy interventions: manual therapy, neuromuscular re-education, patient education, therapeutic activities, therapeutic exercise and home exercise program  Frequency: 2x week  Duration in weeks: 6  Treatment plan discussed with: patient        Subjective Evaluation    History of Present Illness  Mechanism of injury: Casa Jeff is a 79 y o  female presenting to physical therapy on 20 with primary complaints of not being able to walk as she used to  She mentions she received a heel lift in the L foot  She reports she is able to get herself dressed but occasionally needs help with her pants if she is wearing long pants  She does struggle to put her socks and shoes on herself  She mentions she can shower but needs help getting in/out and has grab bars in the shower itself  Casa Jeff reports she can walk for about 10 minutes prior to needing to stop  She no longer attempts to negotiate steps or curbs she has a ramp at home and will always use a ramp outside  She mentions she has discomfort in her L foot while she is walking  The discomfort she believes is from the heel lift and the pain is at the bottom of her foot  Pain  Quality: discomfort  Relieving factors: rest  Aggravating factors: walking and standing    Treatments  Previous treatment: physical therapy  Patient Goals  Patient goals for therapy: increased motion, increased strength and independence with ADLs/IADLs  Patient goal: "walk better"        Objective     Gait: wheeled walker, forward lean, decreased step length and height bilaterally, shoe lift on L foot      TU:25 8 seconds with rolling walker       Balance: FTEO: fair, FTEC: poor      Ankle A/PROM:  R: DF 7 deg, PF 30 deg, ev 8 deg, inv 47 deg    L: DF -10 deg, PF 20 deg, ev 5 deg (P!), inv 5 deg      MMT:   R: DF 4+ , PF 4+, ev 4+ , inv 4+  L: DF minimal resistance without full range , PF 3, ev 4 , inv 4       Precautions: PMH depression, CA, PE    Manuals             Ankle TCJ AP Mob              PROM/Stretch DF/PF/IV/ER                                       Neuro Re-Ed Ther Ex             Seated Toe Raises  10x            Strap Calf Stretch 10"x10            Ankle AROM DF/PF/IV/EV 10xea            BAPS seated f/b,s/s,cw/ccw                                                                 Ther Activity                                       Gait Training             RW Gait Training                          Modalities

## 2020-07-15 ENCOUNTER — OFFICE VISIT (OUTPATIENT)
Dept: PHYSICAL THERAPY | Facility: CLINIC | Age: 80
End: 2020-07-15
Payer: MEDICARE

## 2020-07-15 DIAGNOSIS — M67.01 TIGHT HEEL CORDS, ACQUIRED, BILATERAL: ICD-10-CM

## 2020-07-15 DIAGNOSIS — M67.02 TIGHT HEEL CORDS, ACQUIRED, BILATERAL: ICD-10-CM

## 2020-07-15 DIAGNOSIS — S86.212D STRAIN OF MUSCLE(S) AND TENDON(S) OF ANTERIOR MUSCLE GROUP AT LOWER LEG LEVEL, LEFT LEG, SUBSEQUENT ENCOUNTER: Primary | ICD-10-CM

## 2020-07-15 DIAGNOSIS — M76.811 ANTERIOR TIBIALIS TENDONITIS OF RIGHT LEG: ICD-10-CM

## 2020-07-15 PROCEDURE — 97110 THERAPEUTIC EXERCISES: CPT

## 2020-07-15 PROCEDURE — 97112 NEUROMUSCULAR REEDUCATION: CPT

## 2020-07-15 PROCEDURE — 97140 MANUAL THERAPY 1/> REGIONS: CPT

## 2020-07-15 NOTE — PROGRESS NOTES
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     M67 02    3  Anterior tibialis tendonitis of right leg M76 811                   Subjective: "I've lost 40#, I have no appetite  I don't have much pain  My left foot is like a drop foot, I had a stress fracture and tendon damage  I had a stress fracture and tendon damage in that foot  I just can't do anything "      Objective: See treatment diary below      Assessment: Tolerated treatment well  Patient would benefit from continued PT      Plan: Continue per plan of care        Precautions: PMH depression, CA, PE    Manuals 7/13 7/15           Ankle TCJ AP Mob   Post talar glide, distract, calc ABD G3           PROM/Stretch DF/PF/IV/ER  DF, ev 20"x  10 ea B                                     Neuro Re-Ed                                                                                                        Ther Ex             Seated Toe Raises  10x            Strap Calf Stretch 10"x10 20"x5 B           Ankle AROM DF/PF/IV/EV 10xea x30           BAPS seated f/b,s/s,cw/ccw                                                                 Ther Activity                                       Gait Training             RW Gait Training                          Modalities

## 2020-07-20 ENCOUNTER — OFFICE VISIT (OUTPATIENT)
Dept: PHYSICAL THERAPY | Facility: CLINIC | Age: 80
End: 2020-07-20
Payer: MEDICARE

## 2020-07-20 DIAGNOSIS — S86.212D STRAIN OF MUSCLE(S) AND TENDON(S) OF ANTERIOR MUSCLE GROUP AT LOWER LEG LEVEL, LEFT LEG, SUBSEQUENT ENCOUNTER: Primary | ICD-10-CM

## 2020-07-20 DIAGNOSIS — M76.811 ANTERIOR TIBIALIS TENDONITIS OF RIGHT LEG: ICD-10-CM

## 2020-07-20 DIAGNOSIS — M67.01 TIGHT HEEL CORDS, ACQUIRED, BILATERAL: ICD-10-CM

## 2020-07-20 DIAGNOSIS — M67.02 TIGHT HEEL CORDS, ACQUIRED, BILATERAL: ICD-10-CM

## 2020-07-20 PROCEDURE — 97112 NEUROMUSCULAR REEDUCATION: CPT

## 2020-07-20 PROCEDURE — 97140 MANUAL THERAPY 1/> REGIONS: CPT

## 2020-07-20 PROCEDURE — 97110 THERAPEUTIC EXERCISES: CPT

## 2020-07-20 NOTE — PROGRESS NOTES
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     M67 02    3  Anterior tibialis tendonitis of right leg M76 811                   Subjective: "About the same  I don't know how this is going to help me " Pt denies pain 0/10 at arrival, amb with slow gait using RW  Objective: See treatment diary below      Assessment: Tolerated treatment fair  Patient would benefit from continued PT For improved strength, flexibility and overall function  Pt frustrated with functional deficits, contributing to a poor attitude towards therapy  Little to no stretch felt with L gastroc strap stretch but pt with pain when strap adjusted for deeper stretch  Treading lightly as to not aggravate sx  Encouraged HEP, self stretching and exercises to progress  Plan: Continue per plan of care        Precautions: PMH depression, CA, PE    Manuals 7/13 7/15 7/20          Ankle TCJ AP Mob   Post talar glide, distract, calc ABD G3 HJ          PROM/Stretch DF/PF/IV/ER  DF, ev 20"x  10 ea B x10 ea B                                    Neuro Re-Ed                                                                                                        Ther Ex             Seated Toe Raises  10x            Strap Calf Stretch 10"x10 20"x5 B X 5 B          Ankle AROM DF/PF/IV/EV 10xea x30 x30          BAPS seated f/b,s/s,cw/ccw                          Nustep   5' L7                                    Ther Activity                                       Gait Training             RW Gait Training                          Modalities

## 2020-07-22 ENCOUNTER — OFFICE VISIT (OUTPATIENT)
Dept: PHYSICAL THERAPY | Facility: CLINIC | Age: 80
End: 2020-07-22
Payer: MEDICARE

## 2020-07-22 DIAGNOSIS — M67.02 TIGHT HEEL CORDS, ACQUIRED, BILATERAL: ICD-10-CM

## 2020-07-22 DIAGNOSIS — M67.01 TIGHT HEEL CORDS, ACQUIRED, BILATERAL: ICD-10-CM

## 2020-07-22 DIAGNOSIS — M76.811 ANTERIOR TIBIALIS TENDONITIS OF RIGHT LEG: ICD-10-CM

## 2020-07-22 DIAGNOSIS — S86.212D STRAIN OF MUSCLE(S) AND TENDON(S) OF ANTERIOR MUSCLE GROUP AT LOWER LEG LEVEL, LEFT LEG, SUBSEQUENT ENCOUNTER: Primary | ICD-10-CM

## 2020-07-22 PROCEDURE — 97140 MANUAL THERAPY 1/> REGIONS: CPT

## 2020-07-22 PROCEDURE — 97110 THERAPEUTIC EXERCISES: CPT

## 2020-07-22 NOTE — PROGRESS NOTES
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     M67 02    3  Anterior tibialis tendonitis of right leg M76 811                   Subjective: "I was hurting after last time  It was my feet " Pt denies pain at arrival        Objective: See treatment diary below      Assessment: Tolerated treatment fair  Patient would benefit from continued PT For improved strength, flexibility and overall function  Slowly improving B ankle mobility  Pitting edema at distal B ankles  Advised pt to keep an eye on swelling  Plan: Continue per plan of care        Precautions: PMH depression, CA, PE    Manuals 7/13 7/15 7/20 7/22         Ankle TCJ AP Mob   Post talar glide, distract, calc ABD G3 HJ HJ         PROM/Stretch DF/PF/IV/ER  DF, ev 20"x  10 ea B x10 ea B x10 B ea                                    Neuro Re-Ed                                                                                                        Ther Ex             Seated Toe Raises  10x            Strap Calf Stretch 10"x10 20"x5 B X 5 B x5 B         Ankle AROM DF/PF/IV/EV 10xea x30 x30 x30         BAPS seated f/b,s/s,cw/ccw                          Nustep   5' L7 8'                                    Ther Activity                                       Gait Training             RW Gait Training                          Modalities

## 2020-07-27 ENCOUNTER — OFFICE VISIT (OUTPATIENT)
Dept: PHYSICAL THERAPY | Facility: CLINIC | Age: 80
End: 2020-07-27
Payer: MEDICARE

## 2020-07-27 DIAGNOSIS — S86.212D STRAIN OF MUSCLE(S) AND TENDON(S) OF ANTERIOR MUSCLE GROUP AT LOWER LEG LEVEL, LEFT LEG, SUBSEQUENT ENCOUNTER: Primary | ICD-10-CM

## 2020-07-27 DIAGNOSIS — M67.02 TIGHT HEEL CORDS, ACQUIRED, BILATERAL: ICD-10-CM

## 2020-07-27 DIAGNOSIS — M67.01 TIGHT HEEL CORDS, ACQUIRED, BILATERAL: ICD-10-CM

## 2020-07-27 DIAGNOSIS — M76.811 ANTERIOR TIBIALIS TENDONITIS OF RIGHT LEG: ICD-10-CM

## 2020-07-27 PROCEDURE — 97112 NEUROMUSCULAR REEDUCATION: CPT

## 2020-07-27 PROCEDURE — 97110 THERAPEUTIC EXERCISES: CPT

## 2020-07-27 PROCEDURE — 97140 MANUAL THERAPY 1/> REGIONS: CPT

## 2020-07-27 NOTE — PROGRESS NOTES
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     M67 02    3  Anterior tibialis tendonitis of right leg M76 811                   Subjective: "No pain, not at the moment" B ankles  Objective: See treatment diary below  Minimal ROM B on BAPS board  Assessment: Tolerated treatment well  Patient would benefit from continued PT   DF ROM improving B  Plan: Continue per plan of care        Precautions: PMH depression, CA, PE    Manuals 7/13 7/15 7/20 7/22 7/27        Ankle TCJ AP Mob   Post talar glide, distract, calc ABD G3 HJ HJ G3        PROM/Stretch DF/PF/IV/ER  DF, ev 20"x  10 ea B x10 ea B x10 B ea  x10 B                                  Neuro Re-Ed             BAPS see below     X                                                                                      Ther Ex             Seated Toe Raises  10x            Strap Calf Stretch 10"x10 20"x5 B X 5 B x5 B Stand x5        Ankle AROM DF/PF/IV/EV 10xea x30 x30 x30         BAPS seated f/b,s/s,cw/ccw     x20 ea B L2        T band PRE B x4 pl             Nustep   5' L7 8'  10'                                  Ther Activity                                       Gait Training             RW Gait Training                          Modalities

## 2020-07-29 ENCOUNTER — OFFICE VISIT (OUTPATIENT)
Dept: PHYSICAL THERAPY | Facility: CLINIC | Age: 80
End: 2020-07-29
Payer: MEDICARE

## 2020-07-29 DIAGNOSIS — M67.02 TIGHT HEEL CORDS, ACQUIRED, BILATERAL: ICD-10-CM

## 2020-07-29 DIAGNOSIS — M67.01 TIGHT HEEL CORDS, ACQUIRED, BILATERAL: ICD-10-CM

## 2020-07-29 DIAGNOSIS — S86.212D STRAIN OF MUSCLE(S) AND TENDON(S) OF ANTERIOR MUSCLE GROUP AT LOWER LEG LEVEL, LEFT LEG, SUBSEQUENT ENCOUNTER: Primary | ICD-10-CM

## 2020-07-29 DIAGNOSIS — M76.811 ANTERIOR TIBIALIS TENDONITIS OF RIGHT LEG: ICD-10-CM

## 2020-07-29 PROCEDURE — 97110 THERAPEUTIC EXERCISES: CPT

## 2020-07-29 PROCEDURE — 97112 NEUROMUSCULAR REEDUCATION: CPT

## 2020-07-29 PROCEDURE — 97140 MANUAL THERAPY 1/> REGIONS: CPT

## 2020-07-29 NOTE — PROGRESS NOTES
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     M67 02    3  Anterior tibialis tendonitis of right leg M76 811                   Subjective: "A little pain on my good foot  I'm worried it's where the stress fracture was, on the arch " Pt c/o's R foot/ankle pain 3/10 at arrival  Notes DOMS from new TE last visit  Objective: See treatment diary below      Assessment: Tolerated treatment well  Patient would benefit from continued PT For improved strength, flexibility and overall function  Needs additional time to complete sets and transfers  Encouraged heel->toe step pattern with gait  Improving ankle ROM  Plan: Continue per plan of care        Precautions: PMH depression, CA, PE    Manuals 7/13 7/15 7/20 7/22 7/27 7/29       Ankle TCJ AP Mob   Post talar glide, distract, calc ABD G3 HJ HJ G3 G3 HJ       PROM/Stretch DF/PF/IV/ER  DF, ev 20"x  10 ea B x10 ea B x10 B ea  x10 B x10 B                                 Neuro Re-Ed             BAPS see below     X                                                                                      Ther Ex             Seated Toe Raises  10x            Strap Calf Stretch 10"x10 20"x5 B X 5 B x5 B Stand x5 x5       Ankle AROM DF/PF/IV/EV 10xea x30 x30 x30         BAPS seated f/b,s/s,cw/ccw     x20 ea B L2 x25  ea B       T band PRE B x4 pl             Nustep   5' L7 8'  10' 10'                                 Ther Activity                                       Gait Training             RW Gait Training      5'                    Modalities

## 2020-08-03 ENCOUNTER — OFFICE VISIT (OUTPATIENT)
Dept: PHYSICAL THERAPY | Facility: CLINIC | Age: 80
End: 2020-08-03
Payer: MEDICARE

## 2020-08-03 DIAGNOSIS — M76.811 ANTERIOR TIBIALIS TENDONITIS OF RIGHT LEG: ICD-10-CM

## 2020-08-03 DIAGNOSIS — S86.212D STRAIN OF MUSCLE(S) AND TENDON(S) OF ANTERIOR MUSCLE GROUP AT LOWER LEG LEVEL, LEFT LEG, SUBSEQUENT ENCOUNTER: Primary | ICD-10-CM

## 2020-08-03 DIAGNOSIS — M67.02 TIGHT HEEL CORDS, ACQUIRED, BILATERAL: ICD-10-CM

## 2020-08-03 DIAGNOSIS — M67.01 TIGHT HEEL CORDS, ACQUIRED, BILATERAL: ICD-10-CM

## 2020-08-03 PROCEDURE — 97140 MANUAL THERAPY 1/> REGIONS: CPT

## 2020-08-03 PROCEDURE — 97110 THERAPEUTIC EXERCISES: CPT

## 2020-08-03 PROCEDURE — 97112 NEUROMUSCULAR REEDUCATION: CPT

## 2020-08-03 NOTE — PROGRESS NOTES
Daily Note     Today's date: 8/3/2020  Patient name: Esther Loaiza  : 1940  MRN: [id number]  Referring provider: David Schmitt DPM  Dx:   Encounter Diagnosis     ICD-10-CM    1  Strain of muscle(s) and tendon(s) of anterior muscle group at lower leg level, left leg, subsequent encounter  S86 212D    2  Tight heel cords, acquired, bilateral  M67 01     M67 02    3  Anterior tibialis tendonitis of right leg  M76 811                   Subjective: "I don't know, there's something missing in my link, I'm not getting any better " Pt notes B feet were painful yesterday as she was on her feet more at home  Denies pain today  Pt agreeable to PT despite poor attitude today  Objective: See treatment diary below      Assessment: Tolerated treatment well  Patient would benefit from continued PT For improved strength, flexibility and overall function  B ankle mobility is improving, albeit slowly  Plan: Continue per plan of care        Precautions: PMH depression, CA, PE    Manuals 7/13 7/15 7/20 7/22 7/27 7/29 8/3      Ankle TCJ AP Mob   Post talar glide, distract, calc ABD G3 HJ HJ G3 G3 HJ G3 HJ      PROM/Stretch DF/PF/IV/ER  DF, ev 20"x  10 ea B x10 ea B x10 B ea  x10 B x10 B X 10 B                                Neuro Re-Ed             BAPS see below     X                                                                                      Ther Ex             Seated Toe Raises  10x            Strap Calf Stretch 10"x10 20"x5 B X 5 B x5 B Stand x5 x5 x5 L, x 2 R (foot p!)      Ankle AROM DF/PF/IV/EV 10xea x30 x30 x30         BAPS seated f/b,s/s,cw/ccw     x20 ea B L2 x25  ea B x25 ea B      T band PRE B x4 pl             Nustep   5' L7 8'  10' 10' 10'      T-band ankle 4 way       green -nv                   Ther Activity                                       Gait Training             RW Gait Training      5'                    Modalities

## 2020-08-05 ENCOUNTER — OFFICE VISIT (OUTPATIENT)
Dept: PHYSICAL THERAPY | Facility: CLINIC | Age: 80
End: 2020-08-05
Payer: MEDICARE

## 2020-08-05 DIAGNOSIS — M76.811 ANTERIOR TIBIALIS TENDONITIS OF RIGHT LEG: ICD-10-CM

## 2020-08-05 DIAGNOSIS — S86.212D STRAIN OF MUSCLE(S) AND TENDON(S) OF ANTERIOR MUSCLE GROUP AT LOWER LEG LEVEL, LEFT LEG, SUBSEQUENT ENCOUNTER: Primary | ICD-10-CM

## 2020-08-05 DIAGNOSIS — M67.02 TIGHT HEEL CORDS, ACQUIRED, BILATERAL: ICD-10-CM

## 2020-08-05 DIAGNOSIS — M67.01 TIGHT HEEL CORDS, ACQUIRED, BILATERAL: ICD-10-CM

## 2020-08-05 PROCEDURE — 97110 THERAPEUTIC EXERCISES: CPT

## 2020-08-05 NOTE — PROGRESS NOTES
Following PT today, pt called back to say she contacted her physician's office RE: her foot pain and was instructed to continue PT

## 2020-08-05 NOTE — PROGRESS NOTES
Daily Note     Today's date: 2020  Patient name: Aime Kumar  : 1940  MRN: [id number]  Referring provider: Ondina Jones DPM  Dx:   Encounter Diagnosis     ICD-10-CM    1  Strain of muscle(s) and tendon(s) of anterior muscle group at lower leg level, left leg, subsequent encounter  S86 212D    2  Tight heel cords, acquired, bilateral  M67 01     M67 02    3  Anterior tibialis tendonitis of right leg  M76 811                   Subjective: "I almost cancelled, my feet were really bothering me yesterday  It felt like I was rocking on sharp rocks on the balls of my feet " Pt denies B foot pain today  Pt making several comments about wanting to self D/C therapy because she feels her foot pain is increasing and she is not making any progress  Objective: See treatment diary below      Assessment: Tolerated treatment well  Patient would benefit from continued PT  Reviewed walking as normally as possible; pt tends to walk flat footed at best but on balls of her feet as well which may be contributing to pt's sx  Pt is, indeed, making progress in regards to B ankle mobility but due to constant complaints of pain levels worsening in B feet, held progression and remainder of today's program    Advised pt to call and discuss her sx with her doctor  Plan: Continue per plan of care        Precautions: PMH depression, CA, PE    Manuals 7/13 7/15 7/20 7/22 7/27 7/29 8/3 8/5     Ankle TCJ AP Mob   Post talar glide, distract, calc ABD G3 HJ HJ G3 G3 HJ G3 HJ hold     PROM/Stretch DF/PF/IV/ER  DF, ev 20"x  10 ea B x10 ea B x10 B ea  x10 B x10 B X 10 B hold                               Neuro Re-Ed             BAPS see below     X                                                                                      Ther Ex             Seated Toe Raises  10x            Strap Calf Stretch 10"x10 20"x5 B X 5 B x5 B Stand x5 x5 x5 L, x 2 R (foot p!) X 3, hold     Ankle AROM DF/PF/IV/EV 10xea x30 x30 x30 BAPS seated f/b,s/s,cw/ccw     x20 ea B L2 x25  ea B x25 ea B x20 ea B     T band PRE B x4 pl             Nustep   5' L7 8'  10' 10' 10' 10'     T-band ankle 4 way       green -nv hold                  Ther Activity                                       Gait Training             RW Gait Training      5'                    Modalities

## 2020-08-10 ENCOUNTER — OFFICE VISIT (OUTPATIENT)
Dept: PHYSICAL THERAPY | Facility: CLINIC | Age: 80
End: 2020-08-10
Payer: MEDICARE

## 2020-08-10 DIAGNOSIS — M67.01 TIGHT HEEL CORDS, ACQUIRED, BILATERAL: ICD-10-CM

## 2020-08-10 DIAGNOSIS — M67.02 TIGHT HEEL CORDS, ACQUIRED, BILATERAL: ICD-10-CM

## 2020-08-10 DIAGNOSIS — M76.811 ANTERIOR TIBIALIS TENDONITIS OF RIGHT LEG: ICD-10-CM

## 2020-08-10 DIAGNOSIS — S86.212D STRAIN OF MUSCLE(S) AND TENDON(S) OF ANTERIOR MUSCLE GROUP AT LOWER LEG LEVEL, LEFT LEG, SUBSEQUENT ENCOUNTER: Primary | ICD-10-CM

## 2020-08-10 PROCEDURE — 97140 MANUAL THERAPY 1/> REGIONS: CPT

## 2020-08-10 PROCEDURE — 97110 THERAPEUTIC EXERCISES: CPT

## 2020-08-10 PROCEDURE — 97112 NEUROMUSCULAR REEDUCATION: CPT

## 2020-08-10 NOTE — PROGRESS NOTES
Daily Note     Today's date: 8/10/2020  Patient name: Pippa Sheffield  : 1940  MRN: [id number]  Referring provider: Kia Cloud DPM  Dx:   Encounter Diagnosis     ICD-10-CM    1  Strain of muscle(s) and tendon(s) of anterior muscle group at lower leg level, left leg, subsequent encounter  S86 212D    2  Tight heel cords, acquired, bilateral  M67 01     M67 02    3  Anterior tibialis tendonitis of right leg  M76 811                   Subjective: "Ok  It's the left one today " Pt c/o's L foot pain 2-3/10 pain at arrival  Pt returns to PT having spoken to nurse at her doctor's office who advised her to continue PT  Objective: See treatment diary below      Assessment: Tolerated treatment fair  Patient would benefit from continued PT For improved strength, flexibility and overall function  Resumed majority of TE program; exercised caution to avoid exacerbation of B foot pain  Plan: Continue per plan of care  Precautions: PMH depression, CA, PE    Manuals 7/13 7/15 7/20 7/22 7/27 7/29 8/3 8/5 8/10    Ankle TCJ AP Mob   Post talar glide, distract, calc ABD G3 HJ HJ G3 G3 HJ G3 HJ hold G3 HJ    PROM/Stretch DF/PF/IV/ER  DF, ev 20"x  10 ea B x10 ea B x10 B ea  x10 B x10 B X 10 B hold x10 B, no R ER, p!                               Neuro Re-Ed             BAPS see below     X                                                                                      Ther Ex             Seated Toe Raises  10x            Strap Calf Stretch 10"x10 20"x5 B X 5 B x5 B Stand x5 x5 x5 L, x 2 R (foot p!) X 3, hold R only, x5    Ankle AROM DF/PF/IV/EV 10xea x30 x30 x30         BAPS seated f/b,s/s,cw/ccw     x20 ea B L2 x25  ea B x25 ea B x20 ea B x20 ea B    T band PRE B x4 pl             Nustep   5' L7 8'  10' 10' 10' 10' 10'     T-band ankle 4 way       green -nv hold hold                 Ther Activity                                       Gait Training             RW Gait Training      5' Modalities

## 2020-08-12 ENCOUNTER — EVALUATION (OUTPATIENT)
Dept: PHYSICAL THERAPY | Facility: CLINIC | Age: 80
End: 2020-08-12
Payer: MEDICARE

## 2020-08-12 ENCOUNTER — TRANSCRIBE ORDERS (OUTPATIENT)
Dept: PHYSICAL THERAPY | Facility: CLINIC | Age: 80
End: 2020-08-12

## 2020-08-12 DIAGNOSIS — M67.01 TIGHT HEEL CORDS, ACQUIRED, BILATERAL: ICD-10-CM

## 2020-08-12 DIAGNOSIS — S86.212D STRAIN OF MUSCLE(S) AND TENDON(S) OF ANTERIOR MUSCLE GROUP AT LOWER LEG LEVEL, LEFT LEG, SUBSEQUENT ENCOUNTER: Primary | ICD-10-CM

## 2020-08-12 DIAGNOSIS — M67.02 TIGHT HEEL CORDS, ACQUIRED, BILATERAL: ICD-10-CM

## 2020-08-12 DIAGNOSIS — M76.811 ANTERIOR TIBIALIS TENDONITIS OF RIGHT LEG: ICD-10-CM

## 2020-08-12 PROCEDURE — 97140 MANUAL THERAPY 1/> REGIONS: CPT

## 2020-08-12 PROCEDURE — 97164 PT RE-EVAL EST PLAN CARE: CPT

## 2020-08-12 PROCEDURE — 97110 THERAPEUTIC EXERCISES: CPT

## 2020-08-12 NOTE — LETTER
2020    Chandana Mitchell MD  [address] [facility] [address]    Patient: Elham Plata   YOB: 1940   Date of Visit: 2020     Encounter Diagnosis     ICD-10-CM    1  Strain of muscle(s) and tendon(s) of anterior muscle group at lower leg level, left leg, subsequent encounter  S86 212D    2  Tight heel cords, acquired, bilateral  M67      M67 02    3  Anterior tibialis tendonitis of right leg  M76 811        Dear Dr uCong Solares:    Thank you for your recent referral of Elham Plata  Please review the attached evaluation summary from Ehsan's recent visit  Please verify that you agree with the plan of care by signing the attached order  If you have any questions or concerns, please do not hesitate to call  I sincerely appreciate the opportunity to share in the care of one of your patients and hope to have another opportunity to work with you in the near future  Sincerely,    Sandee Munoz, PT      Referring Provider:      I certify that I have read the below Plan of Care and certify the need for these services furnished under this plan of treatment while under my care  Chandana Mitchell MD  [address] [address]  VIA Facsimile: [phone]          PT RE-EVALUATION    Today's date: 2020  Patient name: Elham Plata  : 1940  MRN: [id number]  Referring provider: Nadir Landrum MD  Dx:   Encounter Diagnosis     ICD-10-CM    1  Strain of muscle(s) and tendon(s) of anterior muscle group at lower leg level, left leg, subsequent encounter  S86 212D    2  Tight heel cords, acquired, bilateral  M67      M67 02    3  Anterior tibialis tendonitis of right leg  M76 811        Start Time: 1100          Subjective: "Not as good as I'd like  I don't think I'm making progress  I want to see the doctor again "  Pt notes L foot pain today, generalized, 2/10  L foot 0/10  PT estimates walking tolerance at 5 min, due to B foot/ankle pain    DPM f/u 8/19/20      Objective: See treatment diary below    PT RE-EVALUATION:    Pain: B 0-4/10, R medial longitudinal arch, L foot/ankle generalized  FOTO functional score 31, projected score 52  Score at eval 35  Higher score = higher function  Gait: with wheeled walker, 3 point reciprocal pattern, slow cristina  TUG: with wheeled walker, 1:02  (1:25 at eval)    Ankle A/PROM:  R: DF 7 deg, PF 35 deg, eversion 8 deg, inversion 47 deg  (DF, eversion,inversion unchanged from eval   PF improved 5 deg )   L: DF -10 deg, PF 37 deg, eversion 10 deg, inversion 22 deg  (DF unchanged, all others improved from eval)  MMT: DF, ev, inv 5/5 B  MMT B gastroc 2+/5, attempted in FWB, no PF motion B  Assessment: Tolerated treatment well  Patient would benefit from continued PT     Iraida Cezar has been compliant with attending PT and home exercise program since initial eval   James Moreau  has made improvements in objective data since initial eval but is still limited compared to prior level of function  James Moreau continues with above listed impairments and would benefit from additional skilled PT to address these deficits to return to prior level of function  James Moreau has attended 10 visits, missed 0 visits  Pt objective progress > subjective progress  PMH depression may be limiting/contributing factor        Goals  STG  Patient will be independent with basic HEP - met  Patient will improve ankle ROM 5 degrees in all planes - partially met  Patient will improve strength 1/2 grade in deficit planes- met    LTG   Patient will improve FOTO to 52 - not met  Patient will improve ambulation tolerance to 20 minutes - not met  Patient will improve TUG time to 1:15 - met    New Goals   2 wks  Patient will improve ankle ROM 5 degrees in all planes  Patient will improve strength 1/2 grade in deficit planes    4-6 wks  Patient will improve FOTO to 52  Patient will improve ambulation tolerance to 20 minutes  Patient will improve TUG time to less than 50 sec  Plan: Continue per plan of care  Recommend PT BIW x 4 wks pending outcome of DPM f/u next week       Precautions: PMH depression, CA, PE    Manuals 7/13 7/15 7/20 7/22 7/27 7/29 8/3 8/5 8/10 8/12   Ankle TCJ AP Mob   Post talar glide, distract, calc ABD G3 HJ HJ G3 G3 HJ G3 HJ hold G3 HJ G3   PROM/Stretch DF/PF/IV/ER  DF, ev 20"x  10 ea B x10 ea B x10 B ea  x10 B x10 B X 10 B hold x10 B, no R ER, p! x10                             Neuro Re-Ed             BAPS see below     X                                                                                      Ther Ex             Seated Toe Raises  10x            Strap Calf Stretch 10"x10 20"x5 B X 5 B x5 B Stand x5 x5 x5 L, x 2 R (foot p!) X 3, hold R only, x5    Ankle AROM DF/PF/IV/EV 10xea x30 x30 x30         BAPS seated f/b,s/s,cw/ccw     x20 ea B L2 x25  ea B x25 ea B x20 ea B x20 ea B    T band PRE B x4 pl             Nustep   5' L7 8'  10' 10' 10' 10' 10'  10'   T-band ankle 4 way       green -nv hold hold                 Ther Activity                                       Gait Training             RW Gait Training      5'                    Modalities

## 2020-08-12 NOTE — PROGRESS NOTES
PT RE-EVALUATION    Today's date: 2020  Patient name: Courtenay Boast  : 1940  MRN: [id number]  Referring provider: Jass Santiago MD  Dx:   Encounter Diagnosis     ICD-10-CM    1  Strain of muscle(s) and tendon(s) of anterior muscle group at lower leg level, left leg, subsequent encounter  S86 212D    2  Tight heel cords, acquired, bilateral  M67 01     M67 02    3  Anterior tibialis tendonitis of right leg  M76 811        Start Time: 1100          Subjective: "Not as good as I'd like  I don't think I'm making progress  I want to see the doctor again "  Pt notes L foot pain today, generalized, 2/10  L foot 0/10  PT estimates walking tolerance at 5 min, due to B foot/ankle pain  DPM f/u 20      Objective: See treatment diary below    PT RE-EVALUATION:    Pain: B 0-4/10, R medial longitudinal arch, L foot/ankle generalized  FOTO functional score 31, projected score 52  Score at eval 35  Higher score = higher function  Gait: with wheeled walker, 3 point reciprocal pattern, slow cristina  TUG: with wheeled walker, 1:02  (1:25 at eval)    Ankle A/PROM:  R: DF 7 deg, PF 35 deg, eversion 8 deg, inversion 47 deg  (DF, eversion,inversion unchanged from eval   PF improved 5 deg )   L: DF -10 deg, PF 37 deg, eversion 10 deg, inversion 22 deg  (DF unchanged, all others improved from eval)  MMT: DF, ev, inv 5/5 B  MMT B gastroc 2+/5, attempted in FWB, no PF motion B  Assessment: Tolerated treatment well  Patient would benefit from continued PT     Courtenay Boast has been compliant with attending PT and home exercise program since initial eval   Aleksander Llanos  has made improvements in objective data since initial eval but is still limited compared to prior level of function  Aleksander Llanos continues with above listed impairments and would benefit from additional skilled PT to address these deficits to return to prior level of function  Aleksander Llanos has attended 10 visits, missed 0 visits      Pt objective progress > subjective progress  PMH depression may be limiting/contributing factor  Goals  STG  Patient will be independent with basic HEP - met  Patient will improve ankle ROM 5 degrees in all planes - partially met  Patient will improve strength 1/2 grade in deficit planes- met    LTG   Patient will improve FOTO to 52 - not met  Patient will improve ambulation tolerance to 20 minutes - not met  Patient will improve TUG time to 1:15 - met    New Goals   2 wks  Patient will improve ankle ROM 5 degrees in all planes  Patient will improve strength 1/2 grade in deficit planes    4-6 wks  Patient will improve FOTO to 52  Patient will improve ambulation tolerance to 20 minutes  Patient will improve TUG time to less than 50 sec  Plan: Continue per plan of care  Recommend PT BIW x 4 wks pending outcome of DPM f/u next week       Precautions: PMH depression, CA, PE    Manuals 7/13 7/15 7/20 7/22 7/27 7/29 8/3 8/5 8/10 8/12   Ankle TCJ AP Mob   Post talar glide, distract, calc ABD G3 HJ HJ G3 G3 HJ G3 HJ hold G3 HJ G3   PROM/Stretch DF/PF/IV/ER  DF, ev 20"x  10 ea B x10 ea B x10 B ea  x10 B x10 B X 10 B hold x10 B, no R ER, p! x10                             Neuro Re-Ed             BAPS see below     X                                                                                      Ther Ex             Seated Toe Raises  10x            Strap Calf Stretch 10"x10 20"x5 B X 5 B x5 B Stand x5 x5 x5 L, x 2 R (foot p!) X 3, hold R only, x5    Ankle AROM DF/PF/IV/EV 10xea x30 x30 x30         BAPS seated f/b,s/s,cw/ccw     x20 ea B L2 x25  ea B x25 ea B x20 ea B x20 ea B    T band PRE B x4 pl             Nustep   5' L7 8'  10' 10' 10' 10' 10'  10'   T-band ankle 4 way       green -nv hold hold                 Ther Activity                                       Gait Training             RW Gait Training      5'                    Modalities

## 2020-08-17 ENCOUNTER — OFFICE VISIT (OUTPATIENT)
Dept: PHYSICAL THERAPY | Facility: CLINIC | Age: 80
End: 2020-08-17
Payer: MEDICARE

## 2020-08-17 DIAGNOSIS — M67.01 TIGHT HEEL CORDS, ACQUIRED, BILATERAL: ICD-10-CM

## 2020-08-17 DIAGNOSIS — M76.811 ANTERIOR TIBIALIS TENDONITIS OF RIGHT LEG: ICD-10-CM

## 2020-08-17 DIAGNOSIS — M67.02 TIGHT HEEL CORDS, ACQUIRED, BILATERAL: ICD-10-CM

## 2020-08-17 DIAGNOSIS — S86.212D STRAIN OF MUSCLE(S) AND TENDON(S) OF ANTERIOR MUSCLE GROUP AT LOWER LEG LEVEL, LEFT LEG, SUBSEQUENT ENCOUNTER: Primary | ICD-10-CM

## 2020-08-17 PROCEDURE — 97140 MANUAL THERAPY 1/> REGIONS: CPT

## 2020-08-17 PROCEDURE — 97110 THERAPEUTIC EXERCISES: CPT

## 2020-08-17 PROCEDURE — 97112 NEUROMUSCULAR REEDUCATION: CPT

## 2020-08-17 NOTE — PROGRESS NOTES
Daily Note     Today's date: 2020  Patient name: Lb Alvarez  : 1940  MRN: [id number]  Referring provider: Princess Nika DPM  Dx:   Encounter Diagnosis     ICD-10-CM    1  Strain of muscle(s) and tendon(s) of anterior muscle group at lower leg level, left leg, subsequent encounter  S86 212D    2  Tight heel cords, acquired, bilateral  M67 01     M67 02    3  Anterior tibialis tendonitis of right leg  M76 811                   Subjective: "My feet feel lumpy  This onne feels weird, (pointing to L), this one is just crazy (Pointing to R) " Pt c/o's B feet pain 2-3/10 at arrival  Pt wants to know if she should continue therapy  Pt adds that her friend/care-taker is moving out at the end of the month and she is worried because she needs help to perform ADLs  Objective: See treatment diary below      Assessment: Tolerated treatment well  Patient would benefit from continued PT For improved strength, flexibility and overall function  Better tolerance to program today with less B foot pain with TE and stretching  PT reviewed results from RE last visit; pt is getting better and improving and PT recommends further therapy at this time  Pt has a f/u with Dr Anderson Left Wednesday, 20  Plan: Continue per plan of care        Precautions: PMH depression, CA, PE    Manuals          8   Ankle TCJ AP Mob   Post talar glide, distract, calc ABD G3 HJ         G3   PROM/Stretch DF/EV 20''x10 B         x10                             Neuro Re-Ed                                                                                                        Ther Ex                          Standing Calf Stretch 20''x5 B                         BAPS seated f/b,s/s,cw/ccw L2 x20 ea B             T band PRE B x4 pl             Nustep 10'            T-band ankle 4 way         hold                 Ther Activity                                       Gait Training             RW Gait Training Modalities

## 2020-08-19 ENCOUNTER — APPOINTMENT (OUTPATIENT)
Dept: PHYSICAL THERAPY | Facility: CLINIC | Age: 80
End: 2020-08-19
Payer: MEDICARE

## 2020-08-24 ENCOUNTER — OFFICE VISIT (OUTPATIENT)
Dept: PHYSICAL THERAPY | Facility: CLINIC | Age: 80
End: 2020-08-24
Payer: MEDICARE

## 2020-08-24 DIAGNOSIS — M76.811 ANTERIOR TIBIALIS TENDONITIS OF RIGHT LEG: ICD-10-CM

## 2020-08-24 DIAGNOSIS — M67.01 TIGHT HEEL CORDS, ACQUIRED, BILATERAL: ICD-10-CM

## 2020-08-24 DIAGNOSIS — S86.212D STRAIN OF MUSCLE(S) AND TENDON(S) OF ANTERIOR MUSCLE GROUP AT LOWER LEG LEVEL, LEFT LEG, SUBSEQUENT ENCOUNTER: Primary | ICD-10-CM

## 2020-08-24 DIAGNOSIS — M67.02 TIGHT HEEL CORDS, ACQUIRED, BILATERAL: ICD-10-CM

## 2020-08-24 PROCEDURE — 97140 MANUAL THERAPY 1/> REGIONS: CPT

## 2020-08-24 PROCEDURE — 97110 THERAPEUTIC EXERCISES: CPT

## 2020-08-24 PROCEDURE — 97112 NEUROMUSCULAR REEDUCATION: CPT

## 2020-08-24 NOTE — PROGRESS NOTES
Daily Note     Today's date: 2020  Patient name: Marleny Arauz  : 1940  MRN: [id number]  Referring provider: Valencia Cutler MD  Dx:   Encounter Diagnosis     ICD-10-CM    1  Strain of muscle(s) and tendon(s) of anterior muscle group at lower leg level, left leg, subsequent encounter  S86 212D    2  Tight heel cords, acquired, bilateral  M67 01     M67 02    3  Anterior tibialis tendonitis of right leg  M76 811                   Subjective: "Shitty  It's just not right  I can't do what I want to do  I can't just get up and go  I have no privacy  She always yells at me " Pt c/o's B ankle pain 3/10 at arrival    Pt's care taker, Rohan Yates, comes into clinic first, states the new script says gait but pt needs upper body strengthening  She confirms she discussed this with the doctor  She adds "she Nawaf Furlong) is not a candidate for surgery", assuming this is per MD  Pt is to f/u with doctor in a few weeks in October  Objective: See treatment diary below      Assessment: Tolerated treatment poor  Patient would benefit from continued PT For improved strength, flexibility and overall function  Added upper body strengthening as per PT instruction  Reviewed GT with RW; emphasized upright posture and longer step length, knee flexion/extension and heel strike  Pt declined abuse reporting  Plan: Continue per plan of care        Precautions: PMH depression, CA, PE    Manuals         8   Ankle TCJ AP Mob   Post talar glide, distract, calc ABD G3 HJ HJ        G3   PROM/Stretch DF/EV 20''x10 B 20''x10 B        x10                             Neuro Re-Ed                                                                                                        Ther Ex                          Standing Calf Stretch 20''x5 B np - no str felt           Gastroc strap stretch  np - no str felt           BAPS seated f/b,s/s,cw/ccw L2 x20 ea B x20 ea B            T band PRE B x4 pl             Nustep 10' 10' L7 T-band ankle 4 way         hold    Seated chair tricep dips  2x10           Bicep curls  2# 2x10           T-band tricep ext  Blk 2x10                        Ther Activity                                       Gait Training             RW Gait Training  5' upright posture, step length                        Modalities

## 2020-08-26 ENCOUNTER — OFFICE VISIT (OUTPATIENT)
Dept: PHYSICAL THERAPY | Facility: CLINIC | Age: 80
End: 2020-08-26
Payer: MEDICARE

## 2020-08-26 DIAGNOSIS — M67.02 TIGHT HEEL CORDS, ACQUIRED, BILATERAL: ICD-10-CM

## 2020-08-26 DIAGNOSIS — M76.811 ANTERIOR TIBIALIS TENDONITIS OF RIGHT LEG: ICD-10-CM

## 2020-08-26 DIAGNOSIS — S86.212D STRAIN OF MUSCLE(S) AND TENDON(S) OF ANTERIOR MUSCLE GROUP AT LOWER LEG LEVEL, LEFT LEG, SUBSEQUENT ENCOUNTER: Primary | ICD-10-CM

## 2020-08-26 DIAGNOSIS — M67.01 TIGHT HEEL CORDS, ACQUIRED, BILATERAL: ICD-10-CM

## 2020-08-26 PROCEDURE — 97110 THERAPEUTIC EXERCISES: CPT

## 2020-08-26 PROCEDURE — 97112 NEUROMUSCULAR REEDUCATION: CPT

## 2020-08-26 PROCEDURE — 97140 MANUAL THERAPY 1/> REGIONS: CPT

## 2020-08-26 NOTE — PROGRESS NOTES
Daily Note     Today's date: 2020  Patient name: Milo Alcazar  : 1940  MRN: [id number]  Referring provider: Khari Marti MD  Dx:   Encounter Diagnosis     ICD-10-CM    1  Strain of muscle(s) and tendon(s) of anterior muscle group at lower leg level, left leg, subsequent encounter  S86 212D    2  Tight heel cords, acquired, bilateral  M67 01     M67 02    3  Anterior tibialis tendonitis of right leg  M76 811                   Subjective: "Wonderful today " Pt very sarcastic at arrival, attempts walking without her RW, carrying it above the ground for a few steps  C/o's of L foot pain 3/10 at arrival  Pt adds that she tried different shoes yesterday because she does not like the heel lift on L shoe  Objective: See treatment diary below      Assessment: Tolerated treatment well  Patient would benefit from continued PT For improved strength, flexibility and overall function  Advised pt to use her RW at all times  It is understandable that patient is frustrated with her sx, but made it a point to review safety precautions  Also suggested pt continue wearing her shoes with heel lift as directed by her doctor  Plan: Continue per plan of care        Precautions: PMH depression, CA, PE    Manuals    Ankle TCJ AP Mob   Post talar glide, distract, calc ABD G3 HJ HJ HJ       G3   PROM/Stretch DF/EV 20''x10 B x10 B x10 B       x10                             Neuro Re-Ed                                                                                                        Ther Ex                          Standing Calf Stretch 20''x5 B np - no str felt           Gastroc strap stretch  np - no str felt           BAPS seated f/b,s/s,cw/ccw L2 x20 ea B x20 ea B L3 x20 ea B           T band PRE B x4 pl             Nustep 10' 10' L7 10'          T-band ankle 4 way         hold    Seated chair tricep dips  2x10 x25          Bicep curls  2# 2x10 3x10          T-band tricep ext  Blk 2x10 x25                       Ther Activity                                       Gait Training             RW Gait Training  5' upright posture, step length                        Modalities

## 2020-08-31 ENCOUNTER — OFFICE VISIT (OUTPATIENT)
Dept: PHYSICAL THERAPY | Facility: CLINIC | Age: 80
End: 2020-08-31
Payer: MEDICARE

## 2020-08-31 DIAGNOSIS — M67.02 TIGHT HEEL CORDS, ACQUIRED, BILATERAL: ICD-10-CM

## 2020-08-31 DIAGNOSIS — M67.01 TIGHT HEEL CORDS, ACQUIRED, BILATERAL: ICD-10-CM

## 2020-08-31 DIAGNOSIS — M76.811 ANTERIOR TIBIALIS TENDONITIS OF RIGHT LEG: ICD-10-CM

## 2020-08-31 DIAGNOSIS — S86.212D STRAIN OF MUSCLE(S) AND TENDON(S) OF ANTERIOR MUSCLE GROUP AT LOWER LEG LEVEL, LEFT LEG, SUBSEQUENT ENCOUNTER: Primary | ICD-10-CM

## 2020-08-31 PROCEDURE — 97110 THERAPEUTIC EXERCISES: CPT

## 2020-08-31 PROCEDURE — 97112 NEUROMUSCULAR REEDUCATION: CPT

## 2020-08-31 PROCEDURE — 97140 MANUAL THERAPY 1/> REGIONS: CPT

## 2020-08-31 NOTE — PROGRESS NOTES
Daily Note     Today's date: 2020  Patient name: Sunny Carrera  : 1940  MRN: [id number]  Referring provider: Braxton Koroma MD  Dx:   Encounter Diagnosis     ICD-10-CM    1  Strain of muscle(s) and tendon(s) of anterior muscle group at lower leg level, left leg, subsequent encounter  S86 212D    2  Tight heel cords, acquired, bilateral  M67 01     M67 02    3  Anterior tibialis tendonitis of right leg  M76 811                   Subjective: "I almost cancelled but Mckenzie made me come  I'm in a lot of pain, this foot, in the area I told you about last time " Pt c/o's of L > R foot pain, 4-5/10 at arrival, localized along medial arch of L foot  "I'm concerned about tearing the tendon, I don't want to do that  I think I'm going to call the doctor "       Objective: See treatment diary below      Assessment: Tolerated treatment fair  Patient would benefit from continued PT For improved strength, flexibility and overall function  Managed to complete program; kept manual stretching gentle to avoid further sx exacerbation  Suggested pt put a call into her doctor to discuss her pain and concerns  Plan: Continue per plan of care        Precautions: PMH depression, CA, PE    Manuals    Ankle TCJ AP Mob   Post talar glide, distract, calc ABD G3 HJ HJ HJ HJ      G3   PROM/Stretch DF/EV 20''x10 B x10 B x10 B x10 B      x10                             Neuro Re-Ed                                                                                                        Ther Ex                          Standing Calf Stretch 20''x5 B np - no str felt           Gastroc strap stretch  np - no str felt           BAPS seated f/b,s/s,cw/ccw L2 x20 ea B x20 ea B L3 x20 ea B x20 ea B          T band PRE B x4 pl             Nustep 10' 10' L7 10' 10'         T-band ankle 4 way         hold    Seated chair tricep dips  2x10 x25 x30         Bicep curls  2# 2x10 3x10 3x10  3#        T-band tricep ext Blk 2x10 x25 x30                      Ther Activity                                       Gait Training             RW Gait Training  5' upright posture, step length                        Modalities

## 2020-09-02 ENCOUNTER — APPOINTMENT (OUTPATIENT)
Dept: PHYSICAL THERAPY | Facility: CLINIC | Age: 80
End: 2020-09-02
Payer: MEDICARE

## 2020-09-08 ENCOUNTER — OFFICE VISIT (OUTPATIENT)
Dept: PHYSICAL THERAPY | Facility: CLINIC | Age: 80
End: 2020-09-08
Payer: MEDICARE

## 2020-09-08 DIAGNOSIS — M67.01 TIGHT HEEL CORDS, ACQUIRED, BILATERAL: ICD-10-CM

## 2020-09-08 DIAGNOSIS — M76.811 ANTERIOR TIBIALIS TENDONITIS OF RIGHT LEG: ICD-10-CM

## 2020-09-08 DIAGNOSIS — S86.212D STRAIN OF MUSCLE(S) AND TENDON(S) OF ANTERIOR MUSCLE GROUP AT LOWER LEG LEVEL, LEFT LEG, SUBSEQUENT ENCOUNTER: Primary | ICD-10-CM

## 2020-09-08 DIAGNOSIS — M67.02 TIGHT HEEL CORDS, ACQUIRED, BILATERAL: ICD-10-CM

## 2020-09-08 PROCEDURE — 97110 THERAPEUTIC EXERCISES: CPT

## 2020-09-08 PROCEDURE — 97112 NEUROMUSCULAR REEDUCATION: CPT

## 2020-09-08 PROCEDURE — 97140 MANUAL THERAPY 1/> REGIONS: CPT

## 2020-09-10 ENCOUNTER — APPOINTMENT (OUTPATIENT)
Dept: PHYSICAL THERAPY | Facility: CLINIC | Age: 80
End: 2020-09-10
Payer: MEDICARE

## 2020-09-14 ENCOUNTER — APPOINTMENT (OUTPATIENT)
Dept: PHYSICAL THERAPY | Facility: CLINIC | Age: 80
End: 2020-09-14
Payer: MEDICARE

## 2020-09-16 ENCOUNTER — APPOINTMENT (OUTPATIENT)
Dept: PHYSICAL THERAPY | Facility: CLINIC | Age: 80
End: 2020-09-16
Payer: MEDICARE

## 2020-09-21 ENCOUNTER — APPOINTMENT (OUTPATIENT)
Dept: PHYSICAL THERAPY | Facility: CLINIC | Age: 80
End: 2020-09-21
Payer: MEDICARE

## 2020-09-23 ENCOUNTER — APPOINTMENT (OUTPATIENT)
Dept: PHYSICAL THERAPY | Facility: CLINIC | Age: 80
End: 2020-09-23
Payer: MEDICARE

## 2020-09-28 ENCOUNTER — APPOINTMENT (OUTPATIENT)
Dept: PHYSICAL THERAPY | Facility: CLINIC | Age: 80
End: 2020-09-28
Payer: MEDICARE

## 2020-09-30 ENCOUNTER — APPOINTMENT (OUTPATIENT)
Dept: PHYSICAL THERAPY | Facility: CLINIC | Age: 80
End: 2020-09-30
Payer: MEDICARE

## 2022-02-08 ENCOUNTER — NURSING HOME VISIT (OUTPATIENT)
Dept: WOUND CARE | Facility: HOSPITAL | Age: 82
End: 2022-02-08
Payer: MEDICARE

## 2022-02-08 DIAGNOSIS — R26.2 AMBULATORY DYSFUNCTION: ICD-10-CM

## 2022-02-08 DIAGNOSIS — L89.152 PRESSURE INJURY OF SACRAL REGION, STAGE 2 (HCC): Primary | ICD-10-CM

## 2022-02-08 PROCEDURE — 99304 1ST NF CARE SF/LOW MDM 25: CPT | Performed by: NURSE PRACTITIONER

## 2022-02-08 NOTE — PROGRESS NOTES
[address] [facility]       Patient ID: Laura Guerrero is a [de-identified] y o  female Date of Birth 1940     Location of Service: [facility]    Performed wound round with: wound team      Chief Complaint   Patient presents with    New Patient Visit     Sacrum       Wound Instructions:  Orders Placed This Encounter   Procedures    Wound cleansing and dressings     Wound:  Sacrum  Discontinue previous wound order  Cleanse the wound bed with soap and water, pat dry  Apply hydraguard to wound bed  Frequency : twice a day and prn for soiling  Offload all wounds  Turn and reposition frequently, maximum of every two hours  Instruct / Assist with weight shifting every 15 - 20 minutes when in chair  Increase protein intake  Monitor for any sign of infection or worsening, inform PCP or patient's primary physician in your facility  Standing Status:   Future     Standing Expiration Date:   2/8/2023       Allergies  Penicillins      Assessment & Plan:  1  Pressure injury of sacral region, stage 2 ([facility] [address] )  Assessment & Plan:  Sacrum  - healed  - hydraguard for prevention  - continue to offload  - sign off    Orders:  -     Wound cleansing and dressings; Future    2  Ambulatory dysfunction  Assessment & Plan:  On STR             Subjective: This is an 20-year-old female referred to our service for wound on the sacrum  As per report, the patient was admitted with ulcer on the sacrum  Possible etiology is pressure ulcer  No current treatment  Received patient in bed, seems comfortable  Patient is a poor historian and was not able to provide reports related to the wound  No other significant issues related to the wound  Review of Systems   Constitutional: Negative  HENT: Negative  Eyes: Negative  Respiratory: Negative  Cardiovascular: Negative for leg swelling  Gastrointestinal: Negative  Endocrine: Negative  Genitourinary: Negative      Musculoskeletal: Positive for gait problem  Skin: Negative for wound  Neurological: Negative for dizziness and headaches  Psychiatric/Behavioral: Negative for behavioral problems  Objective: There were no vitals taken for this visit  Physical Exam  Constitutional:       Appearance: Normal appearance  HENT:      Head: Normocephalic and atraumatic  Nose: Nose normal       Mouth/Throat:      Mouth: Mucous membranes are moist    Eyes:      Conjunctiva/sclera: Conjunctivae normal    Cardiovascular:      Rate and Rhythm: Normal rate  Pulmonary:      Effort: Pulmonary effort is normal    Abdominal:      Palpations: Abdomen is soft  Tenderness: There is no abdominal tenderness  There is no guarding  Genitourinary:     Comments: incontinent  Musculoskeletal:         General: No tenderness  Cervical back: Normal range of motion  Right lower leg: No edema  Left lower leg: No edema  Comments: LROM   Skin:     General: Skin is warm  Findings: Lesion present  Comments: Sacrum - healed   Neurological:      Mental Status: She is alert  Gait: Gait abnormal    Psychiatric:         Mood and Affect: Mood normal          Behavior: Behavior normal               Procedures           Patient's care was coordinated with nursing facility staff  Recent vitals, labs and updated medications were reviewed on EMR or chart system of facility   Past Medical, surgical, social, medication and allergy history and patient's previous records were reviewed and updated as appropriate:     Patient Active Problem List   Diagnosis    Primary osteoarthritis of left hip    Fatigue    Depression    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    Dyspnea on exertion    Anemia    Pelvic mass in female    Uterine prolapse    Medicare annual wellness visit, subsequent    Pressure injury of sacral region, stage 2 ([address] )    Ambulatory dysfunction     Past Medical History:   Diagnosis Date    Anemia     Depression Past Surgical History:   Procedure Laterality Date    HIP SURGERY       Social History     Socioeconomic History    Marital status:      Spouse name: None    Number of children: None    Years of education: None    Highest education level: None   Occupational History    Occupation: RETIRED   Tobacco Use    Smoking status: Former Smoker    Smokeless tobacco: Never Used   Substance and Sexual Activity    Alcohol use: Yes    Drug use: None    Sexual activity: None   Other Topics Concern    None   Social History Narrative    LIVES ALONE WITH HELP AVAILABLE     Social Determinants of Health     Financial Resource Strain: Not on file   Food Insecurity: Not on file   Transportation Needs: Not on file   Physical Activity: Not on file   Stress: Not on file   Social Connections: Not on file   Intimate Partner Violence: Not on file   Housing Stability: Not on file        Current Outpatient Medications:     citalopram (CeleXA) 10 mg tablet, Take 1 tablet (10 mg total) by mouth daily, Disp: 90 tablet, Rfl: 3    Garlic 10 MG CAPS, garlic, Disp: , Rfl:     iron polysaccharides (FERREX 150) 150 mg capsule, Take 325 mg by mouth, Disp: , Rfl:     methocarbamol (ROBAXIN) 500 mg tablet, Take 1 tablet (500 mg total) by mouth 4 (four) times a day for 28 days, Disp: 20 tablet, Rfl: 0    Misc Natural Products (LUTEIN 20 PO), Take 1 tablet by mouth, Disp: , Rfl:     Multiple Vitamins-Minerals (CENTRUM SILVER 50+WOMEN) TABS, Take 1 tablet by mouth, Disp: , Rfl:     Omega-3 Fatty Acids (FISH OIL) 1,000 mg, Fish Oil, Disp: , Rfl:   Family History   Problem Relation Age of Onset    Lung cancer Mother     Diabetes Father     Diabetes Maternal Grandmother               Coordination of Care: wound team aware of the treatment plan    Patient / Staff education : Patient / Staff was given education on sign of infection and pressure ulcer prevention   Patient/ Staff verbalized understanding     Follow up :  Return sign off     Voice-recognition software may have been used in the preparation of this document  Occasional wrong word or "sound-alike" substitutions may have occurred due to the inherent limitations of voice recognition software  Interpretation should be guided by context        LUCINA Montanez

## 2022-02-08 NOTE — PATIENT INSTRUCTIONS
Orders Placed This Encounter   Procedures    Wound cleansing and dressings     Wound:  Sacrum  Discontinue previous wound order  Cleanse the wound bed with soap and water, pat dry  Apply hydraguard to wound bed  Frequency : twice a day and prn for soiling  Offload all wounds  Turn and reposition frequently, maximum of every two hours  Instruct / Assist with weight shifting every 15 - 20 minutes when in chair  Increase protein intake  Monitor for any sign of infection or worsening, inform PCP or patient's primary physician in your facility       Standing Status:   Future     Standing Expiration Date:   2/8/2023

## 2024-05-29 NOTE — PROGRESS NOTES
Daily Note     Today's date: 2019  Patient name: Jaci Siegel  : 1940  MRN: [id number]  Referring provider: Rosales Poole, PT  Dx:   Encounter Diagnosis     ICD-10-CM    1  Left ankle pain, unspecified chronicity M25 572    2  Tear of tibialis anterior tendon S86 219A    3  Left foot pain M79 672                   Subjective: "It's not as bad today, I did a lot of walking yesterday to the doctor's and what not, it really bothered me on the ball of my foot and my big toe " L foot pain 2/10 at arrival, arrives amb with RW, wearing sneakers without MAFO and demonstrating decreased heel strike  "That brace doesn't fit in these sneakers "       Objective: See treatment diary below    As per PT: Subtalar mobility normal, Decreased talar posterior glide mobility         Assessment: Tolerated treatment well  Patient would benefit from continued PT For improved strength, flexibility and overall function  Limited ankle mobility at this time  Focusing on improving DF  Encouraged self stretching and emphasized heel->toe step pattern  Plan: Continue per plan of care              Precautions: none    Daily Treatment Diary       Manuals 19           Man str L gastroc, soleus  KAT           Ankle mobs L post talar  HJ Plus talar dist          Post talar glide   G4                        Exercise Diary              Elev AROM DF/PF, ev/inv, cw/ccw x20 ea x20 ea           Strap gastroc str pull R > L hand 20"x5 x5           BAPS seated             T band DF  Yellow 2x10           Wall gastroc str w towel roll  20''x5           gastroc stretch off TM step  Off step today 20''x5                                                                                                                                                                                                                           Modalities             Ionto w dex L ant tib tendon  done Patient education reassurance given

## 2024-06-02 NOTE — PROGRESS NOTES
Daily Note     Today's date: 2020  Patient name: Braydon Cornell  : 1940  MRN: [id number]  Referring provider: Sukhwinder Lewis MD  Dx:   Encounter Diagnosis     ICD-10-CM    1  Strain of muscle(s) and tendon(s) of anterior muscle group at lower leg level, left leg, subsequent encounter  S86 212D    2  Tight heel cords, acquired, bilateral  M67 01     M67 02    3  Anterior tibialis tendonitis of right leg  M76 811                   Subjective: "So so "  L ankle pain, R medial longitudinal arch pain 3-4/10  Objective: See treatment diary below      Assessment: Tolerated treatment well  Patient would benefit from continued PT  Improved joint mobility B ankles  Pt able to perform strap gastroc stretch and feel calf stretch vs foot/ankle pain  Plan: Continue per plan of care        Precautions: PMH depression, CA, PE    Manuals    Ankle TCJ AP Mob   Post talar glide, distract, calc ABD G3 HJ HJ HJ HJ G3     G3   PROM/Stretch DF/EV 20''x10 B x10 B x10 B x10 B x10      x10                             Neuro Re-Ed                                                                                                        Ther Ex                          Standing Calf Stretch 20''x5 B np - no str felt           Gastroc strap stretch  np - no str felt   20"x5 B *       BAPS seated f/b,s/s,cw/ccw L2 x20 ea B x20 ea B L3 x20 ea B x20 ea B x20 ea B         T band PRE B x4 pl     nv        Nustep 10' 10' L7 10' 10' 10'        T-band ankle 4 way         hold    Seated chair tricep dips  2x10 x25 x30 3x10        Bicep curls  2# 2x10 3x10 3x10  3# 3x10        T-band tricep ext  Blk 2x10 x25 x30 x30                     Ther Activity                                       Gait Training             RW Gait Training  5' upright posture, step length                        Modalities
Pt called today requesting D/C to [facility] from PT  Pt reports increased foot pain today following PT yesterday, feels she is not making progress in PT  Pt attended 15 visits, missed 1 since 7/13/20 eval   D/C to [facility] per pt preference 
No/Not applicable